# Patient Record
Sex: MALE | Race: WHITE | NOT HISPANIC OR LATINO | Employment: FULL TIME | URBAN - METROPOLITAN AREA
[De-identification: names, ages, dates, MRNs, and addresses within clinical notes are randomized per-mention and may not be internally consistent; named-entity substitution may affect disease eponyms.]

---

## 2018-09-14 ENCOUNTER — TRANSFERRED RECORDS (OUTPATIENT)
Dept: HEALTH INFORMATION MANAGEMENT | Facility: CLINIC | Age: 53
End: 2018-09-14

## 2018-09-14 ENCOUNTER — HOSPITAL ENCOUNTER (EMERGENCY)
Facility: CLINIC | Age: 53
Discharge: HOME | End: 2018-09-14
Attending: PHYSICIAN ASSISTANT | Admitting: PHYSICIAN ASSISTANT
Payer: COMMERCIAL

## 2018-09-14 ENCOUNTER — APPOINTMENT (OUTPATIENT)
Dept: CT IMAGING | Facility: CLINIC | Age: 53
End: 2018-09-14
Attending: PHYSICIAN ASSISTANT
Payer: COMMERCIAL

## 2018-09-14 ENCOUNTER — APPOINTMENT (OUTPATIENT)
Dept: GENERAL RADIOLOGY | Facility: CLINIC | Age: 53
End: 2018-09-14
Attending: PHYSICIAN ASSISTANT
Payer: COMMERCIAL

## 2018-09-14 VITALS
RESPIRATION RATE: 18 BRPM | SYSTOLIC BLOOD PRESSURE: 127 MMHG | DIASTOLIC BLOOD PRESSURE: 75 MMHG | HEART RATE: 67 BPM | OXYGEN SATURATION: 97 % | TEMPERATURE: 98.4 F

## 2018-09-14 DIAGNOSIS — R50.9 FEVER, UNSPECIFIED FEVER CAUSE: ICD-10-CM

## 2018-09-14 DIAGNOSIS — R51.9 NONINTRACTABLE HEADACHE, UNSPECIFIED CHRONICITY PATTERN, UNSPECIFIED HEADACHE TYPE: ICD-10-CM

## 2018-09-14 LAB
ALBUMIN SERPL-MCNC: 3.7 G/DL (ref 3.4–5)
ALBUMIN UR-MCNC: 100 MG/DL
ALP SERPL-CCNC: 48 U/L (ref 40–150)
ALT SERPL W P-5'-P-CCNC: 133 U/L (ref 0–70)
ANION GAP SERPL CALCULATED.3IONS-SCNC: 8 MMOL/L (ref 3–14)
APPEARANCE UR: CLEAR
AST SERPL W P-5'-P-CCNC: 125 U/L (ref 0–45)
BACTERIA #/AREA URNS HPF: ABNORMAL /HPF
BASOPHILS # BLD AUTO: 0.1 10E9/L (ref 0–0.2)
BASOPHILS NFR BLD AUTO: 1 %
BILIRUB SERPL-MCNC: 0.7 MG/DL (ref 0.2–1.3)
BILIRUB UR QL STRIP: NEGATIVE
BUN SERPL-MCNC: 17 MG/DL (ref 7–30)
CALCIUM SERPL-MCNC: 8.3 MG/DL (ref 8.5–10.1)
CHLORIDE SERPL-SCNC: 91 MMOL/L (ref 94–109)
CO2 BLDCOV-SCNC: 28 MMOL/L (ref 21–28)
CO2 SERPL-SCNC: 29 MMOL/L (ref 20–32)
COLOR UR AUTO: YELLOW
CREAT SERPL-MCNC: 1.32 MG/DL (ref 0.66–1.25)
DIFFERENTIAL METHOD BLD: NORMAL
EOSINOPHIL # BLD AUTO: 0.1 10E9/L (ref 0–0.7)
EOSINOPHIL NFR BLD AUTO: 1 %
ERYTHROCYTE [DISTWIDTH] IN BLOOD BY AUTOMATED COUNT: 12.7 % (ref 10–15)
GFR SERPL CREATININE-BSD FRML MDRD: 57 ML/MIN/1.7M2
GLUCOSE SERPL-MCNC: 116 MG/DL (ref 70–99)
GLUCOSE UR STRIP-MCNC: NEGATIVE MG/DL
HCT VFR BLD AUTO: 40.4 % (ref 40–53)
HGB BLD-MCNC: 14.1 G/DL (ref 13.3–17.7)
HGB UR QL STRIP: ABNORMAL
KETONES UR STRIP-MCNC: 5 MG/DL
LACTATE BLD-SCNC: 0.7 MMOL/L (ref 0.7–2.1)
LEUKOCYTE ESTERASE UR QL STRIP: NEGATIVE
LYMPHOCYTES # BLD AUTO: 3.5 10E9/L (ref 0.8–5.3)
LYMPHOCYTES NFR BLD AUTO: 40 %
MCH RBC QN AUTO: 30.5 PG (ref 26.5–33)
MCHC RBC AUTO-ENTMCNC: 34.9 G/DL (ref 31.5–36.5)
MCV RBC AUTO: 87 FL (ref 78–100)
MONOCYTES # BLD AUTO: 0.7 10E9/L (ref 0–1.3)
MONOCYTES NFR BLD AUTO: 8 %
NEUTROPHILS # BLD AUTO: 4.4 10E9/L (ref 1.6–8.3)
NEUTROPHILS NFR BLD AUTO: 50 %
NITRATE UR QL: NEGATIVE
PCO2 BLDV: 35 MM HG (ref 40–50)
PH BLDV: 7.52 PH (ref 7.32–7.43)
PH UR STRIP: 6 PH (ref 5–7)
PLATELET # BLD AUTO: 151 10E9/L (ref 150–450)
PLATELET # BLD EST: NORMAL 10*3/UL
PO2 BLDV: 53 MM HG (ref 25–47)
POTASSIUM SERPL-SCNC: 3.4 MMOL/L (ref 3.4–5.3)
PROT SERPL-MCNC: 7.5 G/DL (ref 6.8–8.8)
RBC # BLD AUTO: 4.62 10E12/L (ref 4.4–5.9)
RBC #/AREA URNS AUTO: <1 /HPF (ref 0–2)
RBC MORPH BLD: NORMAL
SAO2 % BLDV FROM PO2: 90 %
SODIUM SERPL-SCNC: 128 MMOL/L (ref 133–144)
SOURCE: ABNORMAL
SP GR UR STRIP: 1.02 (ref 1–1.03)
UROBILINOGEN UR STRIP-MCNC: 4 MG/DL (ref 0–2)
WBC # BLD AUTO: 8.7 10E9/L (ref 4–11)
WBC #/AREA URNS AUTO: <1 /HPF (ref 0–5)

## 2018-09-14 PROCEDURE — 36415 COLL VENOUS BLD VENIPUNCTURE: CPT | Performed by: PHYSICIAN ASSISTANT

## 2018-09-14 PROCEDURE — 85025 COMPLETE CBC W/AUTO DIFF WBC: CPT | Performed by: EMERGENCY MEDICINE

## 2018-09-14 PROCEDURE — 99285 EMERGENCY DEPT VISIT HI MDM: CPT | Mod: 25

## 2018-09-14 PROCEDURE — 87040 BLOOD CULTURE FOR BACTERIA: CPT | Performed by: PHYSICIAN ASSISTANT

## 2018-09-14 PROCEDURE — 81001 URINALYSIS AUTO W/SCOPE: CPT | Performed by: PHYSICIAN ASSISTANT

## 2018-09-14 PROCEDURE — 62270 DX LMBR SPI PNXR: CPT

## 2018-09-14 PROCEDURE — 25000128 H RX IP 250 OP 636: Performed by: PHYSICIAN ASSISTANT

## 2018-09-14 PROCEDURE — 70450 CT HEAD/BRAIN W/O DYE: CPT

## 2018-09-14 PROCEDURE — 96361 HYDRATE IV INFUSION ADD-ON: CPT

## 2018-09-14 PROCEDURE — 82803 BLOOD GASES ANY COMBINATION: CPT

## 2018-09-14 PROCEDURE — 80053 COMPREHEN METABOLIC PANEL: CPT | Performed by: EMERGENCY MEDICINE

## 2018-09-14 PROCEDURE — 96374 THER/PROPH/DIAG INJ IV PUSH: CPT

## 2018-09-14 PROCEDURE — 71046 X-RAY EXAM CHEST 2 VIEWS: CPT

## 2018-09-14 PROCEDURE — 83605 ASSAY OF LACTIC ACID: CPT

## 2018-09-14 RX ORDER — LIDOCAINE HYDROCHLORIDE 10 MG/ML
INJECTION, SOLUTION INFILTRATION; PERINEURAL
Status: DISCONTINUED
Start: 2018-09-14 | End: 2018-09-15 | Stop reason: HOSPADM

## 2018-09-14 RX ORDER — KETOROLAC TROMETHAMINE 30 MG/ML
30 INJECTION, SOLUTION INTRAMUSCULAR; INTRAVENOUS ONCE
Status: COMPLETED | OUTPATIENT
Start: 2018-09-14 | End: 2018-09-14

## 2018-09-14 RX ORDER — LIDOCAINE 40 MG/G
CREAM TOPICAL
Status: DISCONTINUED | OUTPATIENT
Start: 2018-09-14 | End: 2018-09-15 | Stop reason: HOSPADM

## 2018-09-14 RX ORDER — HYDROMORPHONE HYDROCHLORIDE 1 MG/ML
0.5 INJECTION, SOLUTION INTRAMUSCULAR; INTRAVENOUS; SUBCUTANEOUS
Status: DISCONTINUED | OUTPATIENT
Start: 2018-09-14 | End: 2018-09-15 | Stop reason: HOSPADM

## 2018-09-14 RX ADMIN — SODIUM CHLORIDE 1000 ML: 9 INJECTION, SOLUTION INTRAVENOUS at 20:24

## 2018-09-14 RX ADMIN — KETOROLAC TROMETHAMINE 30 MG: 30 INJECTION, SOLUTION INTRAMUSCULAR at 20:23

## 2018-09-14 NOTE — ED AVS SNAPSHOT
[facility] Emergency Department    [address]    [facility] [address]    Phone:  [phone]    Fax:  [phone]                                       Isacc Cool   MRN: [id number]    Department:  [facility] Emergency Department   Date of Visit:  9/14/2018           After Visit Summary Signature Page     I have received my discharge instructions, and my questions have been answered. I have discussed any challenges I see with this plan with the nurse or doctor.    ..........................................................................................................................................  Patient/Patient Representative Signature      ..........................................................................................................................................  Patient Representative Print Name and Relationship to Patient    ..................................................               ................................................  Date                                   Time    ..........................................................................................................................................  Reviewed by Signature/Title    ...................................................              ..............................................  Date                                               Time          22EPIC Rev 08/18

## 2018-09-14 NOTE — ED AVS SNAPSHOT
[facility] Emergency Department    [address]    [facility] [address]    Phone:  [phone]    Fax:  [phone]                                       Isacc Cool   MRN: [id number]    Department:  [facility] Emergency Department   Date of Visit:  9/14/2018           Patient Information     Date Of Birth          1965        Your diagnoses for this visit were:     Nonintractable headache, unspecified chronicity pattern, unspecified headache type     Fever, unspecified fever cause        You were seen by Lenora Fofana PA-C.      Follow-up Information     Follow up with [facility] Emergency Department.    Specialty:  EMERGENCY MEDICINE    Why:  If symptoms worsen    Contact information:    [address]  [facility] [address] [phone]        Discharge Instructions       Discharge Instructions  Headache    You were seen today for a headache. Headaches may be caused by many different things such as muscle tension, sinus inflammation, anxiety and stress, having too little sleep, too much alcohol, some medical conditions or injury. You may have a migraine, which is caused by changes in the blood vessels in your head.  At this time your provider does not find that your headache is a sign of anything dangerous or life-threatening.  However, sometimes the signs of serious illness do not show up right away.      Generally, every Emergency Department visit should have a follow-up clinic visit with either a primary or a specialty clinic/provider. Please follow-up as instructed by your emergency provider today.    Return to the Emergency Department if:    You get a new fever of 100.4 F or higher.    Your headache gets much worse.    You get a stiff neck with your headache.    You get a new headache that is significantly different or worse than headaches you have had before.    You are vomiting (throwing up) and cannot keep food or water  down.    You have blurry or double vision or other problems with your eyes.    You have a new weakness on one side of your body.    You have difficulty with balance which is new.    You or your family thinks you are confused.    You have a seizure.    What can I do to help myself?    Pain medications - You may take a pain medication such as Tylenol  (acetaminophen), Advil , Motrin  (ibuprofen) or Aleve  (naproxen).    Take a pain reliever as soon as you notice symptoms.  Starting medications as soon as you start to have symptoms may lessen the amount of pain you have.    Relaxing in a quiet, dark room may help.    Get enough sleep and eat meals regularly.    You may need to watch for certain foods or other things which may trigger your headaches.  Keeping a journal of your headaches and possible triggers may help you and your primary provider to identify things which you should avoid which may be causing your headaches.  If you were given a prescription for medicine here today, be sure to read all of the information (including the package insert) that comes with your prescription.  This will include important information about the medicine, its side effects, and any warnings that you need to know about.  The pharmacist who fills the prescription can provide more information and answer questions you may have about the medicine.  If you have questions or concerns that the pharmacist cannot address, please call or return to the Emergency Department.   Remember that you can always come back to the Emergency Department if you are not able to see your regular provider in the amount of time listed above, if you get any new symptoms, or if there is anything that worries you.    24 Hour Appointment Hotline       To make an appointment at any [facility], call [phone] ([phone]). If you don't have a family doctor or clinic, we will help you find one. [facility] are conveniently located to serve the  needs of you and your family.             Review of your medicines      Our records show that you are taking the medicines listed below. If these are incorrect, please call your family doctor or clinic.        Dose / Directions Last dose taken    cetirizine 10 MG tablet   Commonly known as:  zyrTEC   Dose:  10 mg        Take 10 mg by mouth daily as needed for allergies   Refills:  0        fluticasone 50 MCG/ACT spray   Commonly known as:  FLONASE   Dose:  2 spray        Spray 2 sprays into both nostrils daily as needed for rhinitis or allergies   Refills:  0        lisinopril-hydrochlorothiazide 20-25 MG per tablet   Commonly known as:  PRINZIDE/ZESTORETIC   Dose:  1 tablet        Take 1 tablet by mouth daily   Refills:  0        OMEPRAZOLE PO   Dose:  20 mg        Take 20 mg by mouth every morning   Refills:  0        * oxyCODONE IR 5 MG tablet   Commonly known as:  ROXICODONE   Dose:  5-10 mg   Quantity:  60 tablet        Take 1-2 tablets (5-10 mg) by mouth every 4 hours as needed for pain or other (Moderate to Severe)   Refills:  0        * oxyCODONE IR 5 MG tablet   Commonly known as:  ROXICODONE   Dose:  5-10 mg   Quantity:  30 tablet        Take 1-2 tablets (5-10 mg) by mouth every 3 hours as needed for moderate to severe pain   Refills:  0        polyethylene glycol Packet   Commonly known as:  MIRALAX/GLYCOLAX   Dose:  17 g   Quantity:  7 packet        Take 17 g by mouth daily as needed for constipation   Refills:  0        * Notice:  This list has 2 medication(s) that are the same as other medications prescribed for you. Read the directions carefully, and ask your doctor or other care provider to review them with you.            Procedures and tests performed during your visit     Procedure/Test Number of Times Performed    Blood culture 2    CBC with platelets differential 1    Comprehensive metabolic panel 1    Draw and hold 3    Head CT w/o contrast 1    ISTAT CG4 gases lactate bhaart nursing POCT 1     ISTAT gases lactate bharat POCT 1    Peripheral IV: Standard 1    Pulse oximetry nursing 1    UA with Microscopic 1    XR Chest 2 Views 1      Orders Needing Specimen Collection     Ordered          09/14/18 2133  Glucose CSF: Tube 2 - STAT, Prio: STAT, Status: Sent     Scheduled Task Status   09/14/18 2133 [employer] CC Reminder: Open   Order Class:  PCU Collect                09/14/18 2133  Protein total CSF: Tube 2 - STAT, Prio: STAT, Status: Sent     Scheduled Task Status   09/14/18 2133 [employer] CC Reminder: Open   Order Class:  PCU Collect                09/14/18 2133  Gram stain - STAT, Prio: STAT, Status: Sent     Scheduled Task Status   09/14/18 2133 [employer] CC Reminder: Open   Order Class:  PCU Collect                09/14/18 2133  CSF Culture Aerobic Bacterial - STAT, Prio: STAT, Status: Sent     Scheduled Task Status   09/14/18 2133 [employer] CC Reminder: Open   Order Class:  PCU Collect                09/14/18 2133  Cell count with differential CSF: Tube 4 - STAT, Prio: STAT, Status: Sent     Scheduled Task Status   09/14/18 2133 [employer] CC Reminder: Open   Order Class:  PCU Collect                  Pending Results     Date and Time Order Name Status Description    9/14/2018 2009 Blood culture Preliminary     9/14/2018 2008 Blood culture Preliminary             Pending Culture Results     Date and Time Order Name Status Description    9/14/2018 2009 Blood culture Preliminary     9/14/2018 2008 Blood culture Preliminary             Pending Results Instructions     If you had any lab results that were not finalized at the time of your Discharge, you can call the ED Lab Result RN at [phone]. You will be contacted by this team for any positive Lab results or changes in treatment. The nurses are available 7 days a week from 10A to 6:30P.  You can leave a message 24 hours per day and they will return your call.        Test Results From Your Hospital Stay        9/14/2018  8:27 PM      Component Results      Component Value Ref Range & Units Status    Sodium 128 (L) 133 - 144 mmol/L Final    Potassium 3.4 3.4 - 5.3 mmol/L Final    Chloride 91 (L) 94 - 109 mmol/L Final    Carbon Dioxide 29 20 - 32 mmol/L Final    Anion Gap 8 3 - 14 mmol/L Final    Glucose 116 (H) 70 - 99 mg/dL Final    Urea Nitrogen 17 7 - 30 mg/dL Final    Creatinine 1.32 (H) 0.66 - 1.25 mg/dL Final    GFR Estimate 57 (L) >60 mL/min/1.7m2 Final    Non  GFR Calc    GFR Estimate If Black 69 >60 mL/min/1.7m2 Final    African American GFR Calc    Calcium 8.3 (L) 8.5 - 10.1 mg/dL Final    Bilirubin Total 0.7 0.2 - 1.3 mg/dL Final    Albumin 3.7 3.4 - 5.0 g/dL Final    Protein Total 7.5 6.8 - 8.8 g/dL Final    Alkaline Phosphatase 48 40 - 150 U/L Final     (H) 0 - 70 U/L Final     (H) 0 - 45 U/L Final         9/14/2018  9:03 PM      Component Results     Component Value Ref Range & Units Status    WBC 8.7 4.0 - 11.0 10e9/L Final    RBC Count 4.62 4.4 - 5.9 10e12/L Final    Hemoglobin 14.1 13.3 - 17.7 g/dL Final    Hematocrit 40.4 40.0 - 53.0 % Final    MCV 87 78 - 100 fl Final    MCH 30.5 26.5 - 33.0 pg Final    MCHC 34.9 31.5 - 36.5 g/dL Final    RDW 12.7 10.0 - 15.0 % Final    Platelet Count 151 150 - 450 10e9/L Final    Diff Method Manual Differential  Final    % Neutrophils 50.0 % Final    % Lymphocytes 40.0 % Final    % Monocytes 8.0 % Final    % Eosinophils 1.0 % Final    % Basophils 1.0 % Final    Absolute Neutrophil 4.4 1.6 - 8.3 10e9/L Final    Absolute Lymphocytes 3.5 0.8 - 5.3 10e9/L Final    Absolute Monocytes 0.7 0.0 - 1.3 10e9/L Final    Absolute Eosinophils 0.1 0.0 - 0.7 10e9/L Final    Absolute Basophils 0.1 0.0 - 0.2 10e9/L Final    RBC Morphology   Final    Consistent with reported results    Platelet Estimate   Final    Automated count confirmed.  Platelet morphology is normal.               9/14/2018  7:57 PM      Component Results     Component Value Ref Range & Units Status    Ph Venous 7.52 (H) 7.32 - 7.43  pH Final    PCO2 Venous 35 (L) 40 - 50 mm Hg Final    PO2 Venous 53 (H) 25 - 47 mm Hg Final    Bicarbonate Venous 28 21 - 28 mmol/L Final    O2 Sat Venous 90 % Final    Lactic Acid 0.7 0.7 - 2.1 mmol/L Final         9/14/2018  8:30 PM      Component Results     Component Value Ref Range & Units Status    Color Urine Yellow  Final    Appearance Urine Clear  Final    Glucose Urine Negative NEG^Negative mg/dL Final    Bilirubin Urine Negative NEG^Negative Final    Ketones Urine 5 (A) NEG^Negative mg/dL Final    Specific Gravity Urine 1.017 1.003 - 1.035 Final    Blood Urine Moderate (A) NEG^Negative Final    pH Urine 6.0 5.0 - 7.0 pH Final    Protein Albumin Urine 100 (A) NEG^Negative mg/dL Final    Urobilinogen mg/dL 4.0 (H) 0.0 - 2.0 mg/dL Final    Nitrite Urine Negative NEG^Negative Final    Leukocyte Esterase Urine Negative NEG^Negative Final    Source Midstream Urine  Final    WBC Urine <1 0 - 5 /HPF Final    RBC Urine <1 0 - 2 /HPF Final    Bacteria Urine Few (A) NEG^Negative /HPF Final         9/14/2018  9:46 PM      Component Results     Component    Specimen Description    Blood Right Arm    Special Requests    Aerobic and anaerobic bottles received    Culture Micro    No growth after 1 hour         9/14/2018  9:19 PM      Narrative     CHEST TWO VIEWS 9/14/2018 9:09 PM     HISTORY: Fever, cough.    COMPARISON: 10/27/2015    FINDINGS: Heart size and pulmonary vascularity are within normal  limits. The lungs are clear. No pneumothorax or pleural effusion.  Cervical fusion hardware noted        Impression     IMPRESSION: No radiographic evidence of acute chest abnormality.     NICOLE MELCHOR MD         9/14/2018 10:37 PM      Component Results     Component    Specimen Description    Blood Left Arm    Special Requests    Aerobic and anaerobic bottles received    Culture Micro    No growth after 1 hour         9/14/2018  9:30 PM      Narrative     CT SCAN OF THE HEAD WITHOUT CONTRAST   9/14/2018 9:18 PM      HISTORY: Headache, fever.    TECHNIQUE: Axial images of the head and coronal reformations without  IV contrast material. Radiation dose for this scan was reduced using  automated exposure control, adjustment of the mA and/or kV according  to patient size, or iterative reconstruction technique.    COMPARISON: 10/23/2015    FINDINGS: The ventricles are normal in size, shape and configuration.  The brain parenchyma and subarachnoid spaces are normal. There is no  evidence of intracranial hemorrhage, mass, acute infarct or anomaly.     The visualized portions of the sinuses and mastoids appear normal.  There is no evidence of trauma.        Impression     IMPRESSION: Normal CT scan of the head. No change.      KATTY GARNETT MD                Clinical Quality Measure: Blood Pressure Screening     Your blood pressure was checked while you were in the emergency department today. The last reading we obtained was  BP: (!) 130/99 . Please read the guidelines below about what these numbers mean and what you should do about them.  If your systolic blood pressure (the top number) is less than 120 and your diastolic blood pressure (the bottom number) is less than 80, then your blood pressure is normal. There is nothing more that you need to do about it.  If your systolic blood pressure (the top number) is 120-139 or your diastolic blood pressure (the bottom number) is 80-89, your blood pressure may be higher than it should be. You should have your blood pressure rechecked within a year by a primary care provider.  If your systolic blood pressure (the top number) is 140 or greater or your diastolic blood pressure (the bottom number) is 90 or greater, you may have high blood pressure. High blood pressure is treatable, but if left untreated over time it can put you at risk for heart attack, stroke, or kidney failure. You should have your blood pressure rechecked by a primary care provider within the next 4 weeks.  If your  "provider in the emergency department today gave you specific instructions to follow-up with your doctor or provider even sooner than that, you should follow that instruction and not wait for up to 4 weeks for your follow-up visit.        Thank you for choosing [facility]       Thank you for choosing [facility] for your care. Our goal is always to provide you with excellent care. Hearing back from our patients is one way we can continue to improve our services. Please take a few minutes to complete the written survey that you may receive in the mail after you visit with us. Thank you!        [employer] Information     [employer] lets you send messages to your doctor, view your test results, renew your prescriptions, schedule appointments and more. To sign up, go to [email or web address] . Click on \"Log in\" on the left side of the screen, which will take you to the Welcome page. Then click on \"Sign up Now\" on the right side of the page.     You will be asked to enter the access code listed below, as well as some personal information. Please follow the directions to create your username and password.     Your access code is: [email or web address]  Expires: 2018 10:54 PM     Your access code will  in 90 days. If you need help or a new code, please call your [facility] clinic or [phone].        Care EveryWhere ID     This is your Care EveryWhere ID. This could be used by other organizations to access your [facility] medical records  [id number]        Equal Access to Services     CLAUDY GUTIERREZ AH: Hadii deepa aguilerao Sobeckie, waaxda luqadaha, qaybta kaalmada vaughn, bertha sanchez . So [facility] [phone].    ATENCIÓN: Si habla español, tiene a conde disposición servicios gratuitos de asistencia lingüística. Llame al [phone].    We comply with applicable federal civil rights laws and [place] laws. We do not discriminate on the basis of race, color, national origin, age, disability, sex, sexual " orientation, or gender identity.            After Visit Summary       This is your record. Keep this with you and show to your community pharmacist(s) and doctor(s) at your next visit.

## 2018-09-15 ASSESSMENT — ENCOUNTER SYMPTOMS
HEADACHES: 1
FEVER: 1
COUGH: 1

## 2018-09-15 NOTE — ED TRIAGE NOTES
ABC's intact.  Alert and oriented x4.    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  Pt presents to ED with fever since Monday, increased headache and neck pain

## 2018-09-15 NOTE — ED PROVIDER NOTES
History     Chief Complaint:  Headache and Fever      HPI   Isacc Cool is a 53 year old male who presents with headache and fever.  Patient reports 4 days of intermittent fevers and persistent headache.  Headache is global and throbbing. it is severe.   Headache is worse with coughing.  He reports some body aches and a dry cough as well.  He has taken some over-the-counter cold medications without relief.  He reports a history of bacterial meningitis that was a complication of a spinal surgery a few years ago.  He denies neck pain or stiffness.  He denies chest pain or shortness of breath.  No abdominal pain, nausea, vomiting, diarrhea, urinary symptoms. Denies sore throat.    Allergies:  No Known Allergies     Medications:      cetirizine (ZYRTEC) 10 MG tablet   fluticasone (FLONASE) 50 MCG/ACT nasal spray   lisinopril-hydrochlorothiazide (PRINZIDE,ZESTORETIC) 20-25 MG per tablet   OMEPRAZOLE PO   oxyCODONE (ROXICODONE) 5 MG immediate release tablet   oxyCODONE (ROXICODONE) 5 MG immediate release tablet   polyethylene glycol (MIRALAX/GLYCOLAX) packet         Past Medical History:    Past Medical History:   Diagnosis Date     Gastro-oesophageal reflux disease      Hypertension      Numbness and tingling      Other chronic pain        Past Surgical History:    Past Surgical History:   Procedure Laterality Date     ARTHROSCOPY KNEE Right     knee     BACK SURGERY      BHA implants     DECOMPRESSION, FUSION CERVICAL ANTERIOR THREE+ LEVELS, COMBINED N/A 6/24/2015    Procedure: COMBINED DECOMPRESSION, FUSION CERVICAL ANTERIOR THREE+ LEVELS;  Surgeon: Reinier Penaloza MD;  Location: [facility] OR     FORAMINOTOMY CERVICAL POSTERIOR ONE LEVEL Right 10/21/2015    Procedure: FORAMINOTOMY CERVICAL POSTERIOR ONE LEVEL;  Surgeon: Reinier Penaloza MD;  Location: [facility] OR       Family History:    History reviewed.  No pertinent history.    Social History:  Marital Status:   [2]  Social History    Substance Use Topics     Smoking status: Never Smoker     Smokeless tobacco: Former User     Types: Chew     Quit date: 1/1/2008     Alcohol use Yes      Comment: 14 cans of beer per week        Review of Systems   Constitutional: Positive for fever.   Respiratory: Positive for cough.    Neurological: Positive for headaches.   All other systems reviewed and are negative.        Physical Exam   First Vitals:  BP: (!) 158/95  Pulse: 102  Temp: 102.7  F (39.3  C)  Resp: 20  SpO2: 98 %      Physical Exam   Constitutional: He is oriented to person, place, and time. No distress.   HENT:   Mouth/Throat: Oropharynx is clear and moist.   Eyes: Conjunctivae are normal.   Neck: Normal range of motion.   Cardiovascular: Normal rate and regular rhythm.    Pulmonary/Chest: Effort normal and breath sounds normal. No respiratory distress.   Abdominal: Soft. He exhibits no distension. There is no tenderness. There is no rebound and no guarding.   Musculoskeletal: Normal range of motion. He exhibits no edema or deformity.   Neurological: He is alert and oriented to person, place, and time. He has normal strength. No sensory deficit. Coordination and gait normal.   Skin: Skin is warm. No petechiae and no rash noted. He is diaphoretic.         Emergency Department Course     Imaging:  Radiographic findings were communicated with the patient who voiced understanding of the findings.  Head CT w/o contrast   Final Result   IMPRESSION: Normal CT scan of the head. No change.         KATTY GARNETT MD      XR Chest 2 Views   Final Result   IMPRESSION: No radiographic evidence of acute chest abnormality.       NICOLE MELCHOR MD          Laboratory:  Labs Ordered and Resulted from Time of ED Arrival Up to the Time of Departure from the ED   COMPREHENSIVE METABOLIC PANEL - Abnormal; Notable for the following:        Result Value    Sodium 128 (*)     Chloride 91 (*)     Glucose 116 (*)     Creatinine 1.32 (*)     GFR Estimate 57 (*)      Calcium 8.3 (*)      (*)      (*)     All other components within normal limits   ROUTINE UA WITH MICROSCOPIC - Abnormal; Notable for the following:     Ketones Urine 5 (*)     Blood Urine Moderate (*)     Protein Albumin Urine 100 (*)     Urobilinogen mg/dL 4.0 (*)     Bacteria Urine Few (*)     All other components within normal limits   ISTAT  GASES LACTATE VICTOR HUGO POCT - Abnormal; Notable for the following:     Ph Venous 7.52 (*)     PCO2 Venous 35 (*)     PO2 Venous 53 (*)     All other components within normal limits   CBC WITH PLATELETS DIFFERENTIAL   PERIPHERAL IV CATHETER   PULSE OXIMETRY NURSING   NURSING DRAW AND HOLD   NURSING DRAW AND HOLD   NURSING DRAW AND HOLD   ISTAT CG4 GASES LACTATE VICTOR HUGO NURSING POCT     Procedures:       Lumbar Puncture:      Time: 2:45 AM  Performed by: Lenora Fofana  Authorized by: Lenora Fofana    Indications: headache and fever    Consent given by: Patient who states understanding of the procedure being performed after discussing the risks, benefits and alternatives.    Prior to the start of the procedure and with procedural staff participation, I verbally confirmed the patient s identity using two indicators, relevant allergies, that the procedure was appropriate and matched the consent or emergent situation, and that the correct equipment/implants were available. Immediately prior to starting the procedure I conducted the Time Out with the procedural staff and re-confirmed the patient s name, procedure, and site/side. (The Joint Commission universal protocol was followed.) Yes    Under sterile conditions the patient was positioned L Lateral decubitus with knees drawn up. Betadine solution and sterile drapes were utilized.  Local anesthetic at the site: 2 ml of lidocaine 1% without epinephrine from the LP tray  A 21 G  spinal needle was inserted at the L 2-3 interspace. 2 attempts were made, but no cerebrospinal fluid was able to be collected so the procedure was  aborted.    Complications:  None    Patient tolerance: Patient tolerated the procedure well with no immediate complications.        Interventions:  Medications   0.9% sodium chloride BOLUS (0 mLs Intravenous Stopped 18)   ketorolac (TORADOL) injection 30 mg (30 mg Intravenous Given 18)         Emergency Department Course:  Past medical records, nursing notes, and vitals reviewed.  I performed an exam of the patient and obtained history, as documented above.  Labs obtained.   The patient was sent for a CT and CXR while in the ED with results above.  LP attempted.  Discussed with IR and anesthesia - IR would need to perform.   Patient then refusing further LP attempts and requesting discharge.   Discharged AMA.      Impression & Plan      Medical Decision Makin-year-old male presenting with headache and fever ×4 days.  Differential diagnosis includes meningitis, influenza, UTI, strep pharyngitis, pneumonia, among others.  Lactate normal so sepsis not likely. There is no evidence of UTI on urinalysis.  No evidence of pneumonia on chest x-ray. No leukocytosis. Chemistry was notable for mild hyponatremia of 128 and mild EDDIE with creatinine of 1.32. patient was given normal saline.  Given his fever and headache without another obvious source of fever, meningitis is of course a concern.  I did attempt an LP, but was unsuccessful.  I discussed with IR and anesthesiology for further assistance with LP.  Anesthesiology did not feel comfortable given his previous lumbar spine surgery.  IR was going to come in to perform LP, but patient then was further declining declining further LP attempts stating he was feeling improved.  I voiced my concern to him that meningitis is still a possibility and that the only way to know would be doing the LP.  I discussed that if this was meningitis, it could progress quickly and he could in fact die from it.  He expressed understanding of this, but continued to  decline further workup or intervention at this time and continued to express wanting to be discharged.  I informed him of his abnormal lab results as well.  He continues to refuse admission for empiric antibiotics or further LP attempts.  He was discharged home AGAINST MEDICAL ADVICE.  He was instructed to return to the emergency department immediately for any new or worsening symptoms Or if he changes his mind about further workup including LP.  He should otherwise follow-up with his PCP if he feels he is continuing to improve.        Diagnosis:    ICD-10-CM    1. Nonintractable headache, unspecified chronicity pattern, unspecified headache type R51 Blood culture     Blood culture   2. Fever, unspecified fever cause R50.9        Disposition:  discharged to home against medical advice.    Discharge Medications:  Discharge Medication List as of 9/14/2018 10:54 PM            Lenora Fofana  9/14/2018   [facility] EMERGENCY DEPARTMENT       Lenora Fofana PA-C  09/15/18 0255

## 2018-09-20 LAB
BACTERIA SPEC CULT: NO GROWTH
BACTERIA SPEC CULT: NO GROWTH
Lab: NORMAL
Lab: NORMAL
SPECIMEN SOURCE: NORMAL
SPECIMEN SOURCE: NORMAL

## 2019-06-07 ENCOUNTER — APPOINTMENT (OUTPATIENT)
Dept: CT IMAGING | Facility: CLINIC | Age: 54
End: 2019-06-07
Attending: EMERGENCY MEDICINE
Payer: COMMERCIAL

## 2019-06-07 ENCOUNTER — HOSPITAL ENCOUNTER (OUTPATIENT)
Facility: CLINIC | Age: 54
Setting detail: OBSERVATION
Discharge: HOME | End: 2019-06-08
Attending: EMERGENCY MEDICINE | Admitting: INTERNAL MEDICINE
Payer: COMMERCIAL

## 2019-06-07 DIAGNOSIS — I20.0 UNSTABLE ANGINA (H): ICD-10-CM

## 2019-06-07 DIAGNOSIS — R07.9 ACUTE CHEST PAIN: Primary | ICD-10-CM

## 2019-06-07 DIAGNOSIS — R91.8 PULMONARY NODULES: ICD-10-CM

## 2019-06-07 PROBLEM — I24.9 ACS (ACUTE CORONARY SYNDROME) (H): Status: ACTIVE | Noted: 2019-06-07

## 2019-06-07 LAB
ANION GAP SERPL CALCULATED.3IONS-SCNC: 8 MMOL/L (ref 3–14)
APTT PPP: 32 SEC (ref 22–37)
BASOPHILS # BLD AUTO: 0.1 10E9/L (ref 0–0.2)
BASOPHILS NFR BLD AUTO: 0.7 %
BUN SERPL-MCNC: 16 MG/DL (ref 7–30)
CALCIUM SERPL-MCNC: 9 MG/DL (ref 8.5–10.1)
CHLORIDE SERPL-SCNC: 96 MMOL/L (ref 94–109)
CO2 SERPL-SCNC: 26 MMOL/L (ref 20–32)
CREAT SERPL-MCNC: 1.12 MG/DL (ref 0.66–1.25)
DIFFERENTIAL METHOD BLD: NORMAL
EOSINOPHIL # BLD AUTO: 0.1 10E9/L (ref 0–0.7)
EOSINOPHIL NFR BLD AUTO: 1 %
ERYTHROCYTE [DISTWIDTH] IN BLOOD BY AUTOMATED COUNT: 12.4 % (ref 10–15)
GFR SERPL CREATININE-BSD FRML MDRD: 75 ML/MIN/{1.73_M2}
GLUCOSE SERPL-MCNC: 110 MG/DL (ref 70–99)
HCT VFR BLD AUTO: 43.3 % (ref 40–53)
HGB BLD-MCNC: 14.9 G/DL (ref 13.3–17.7)
IMM GRANULOCYTES # BLD: 0 10E9/L (ref 0–0.4)
IMM GRANULOCYTES NFR BLD: 0.4 %
INR PPP: 0.99 (ref 0.86–1.14)
INTERPRETATION ECG - MUSE: NORMAL
LYMPHOCYTES # BLD AUTO: 3.8 10E9/L (ref 0.8–5.3)
LYMPHOCYTES NFR BLD AUTO: 46.2 %
MCH RBC QN AUTO: 30.8 PG (ref 26.5–33)
MCHC RBC AUTO-ENTMCNC: 34.4 G/DL (ref 31.5–36.5)
MCV RBC AUTO: 90 FL (ref 78–100)
MONOCYTES # BLD AUTO: 1.2 10E9/L (ref 0–1.3)
MONOCYTES NFR BLD AUTO: 14.3 %
NEUTROPHILS # BLD AUTO: 3.1 10E9/L (ref 1.6–8.3)
NEUTROPHILS NFR BLD AUTO: 37.4 %
NRBC # BLD AUTO: 0 10*3/UL
NRBC BLD AUTO-RTO: 0 /100
PLATELET # BLD AUTO: 161 10E9/L (ref 150–450)
POTASSIUM SERPL-SCNC: 3.1 MMOL/L (ref 3.4–5.3)
POTASSIUM SERPL-SCNC: 3.3 MMOL/L (ref 3.4–5.3)
RBC # BLD AUTO: 4.83 10E12/L (ref 4.4–5.9)
SODIUM SERPL-SCNC: 130 MMOL/L (ref 133–144)
TROPONIN I BLD-MCNC: 0.01 UG/L (ref 0–0.08)
TROPONIN I SERPL-MCNC: <0.015 UG/L (ref 0–0.04)
TROPONIN I SERPL-MCNC: <0.015 UG/L (ref 0–0.04)
WBC # BLD AUTO: 8.1 10E9/L (ref 4–11)

## 2019-06-07 PROCEDURE — 12000000 ZZH R&B MED SURG/OB

## 2019-06-07 PROCEDURE — 25000128 H RX IP 250 OP 636: Performed by: INTERNAL MEDICINE

## 2019-06-07 PROCEDURE — 99223 1ST HOSP IP/OBS HIGH 75: CPT | Mod: AI | Performed by: INTERNAL MEDICINE

## 2019-06-07 PROCEDURE — 84132 ASSAY OF SERUM POTASSIUM: CPT | Performed by: INTERNAL MEDICINE

## 2019-06-07 PROCEDURE — 85730 THROMBOPLASTIN TIME PARTIAL: CPT | Performed by: EMERGENCY MEDICINE

## 2019-06-07 PROCEDURE — 25000128 H RX IP 250 OP 636: Performed by: EMERGENCY MEDICINE

## 2019-06-07 PROCEDURE — 96360 HYDRATION IV INFUSION INIT: CPT | Mod: 59

## 2019-06-07 PROCEDURE — 96372 THER/PROPH/DIAG INJ SC/IM: CPT

## 2019-06-07 PROCEDURE — 83735 ASSAY OF MAGNESIUM: CPT | Performed by: INTERNAL MEDICINE

## 2019-06-07 PROCEDURE — 71260 CT THORAX DX C+: CPT

## 2019-06-07 PROCEDURE — 25800030 ZZH RX IP 258 OP 636: Performed by: EMERGENCY MEDICINE

## 2019-06-07 PROCEDURE — 84484 ASSAY OF TROPONIN QUANT: CPT

## 2019-06-07 PROCEDURE — 84484 ASSAY OF TROPONIN QUANT: CPT | Performed by: INTERNAL MEDICINE

## 2019-06-07 PROCEDURE — 25000132 ZZH RX MED GY IP 250 OP 250 PS 637: Performed by: EMERGENCY MEDICINE

## 2019-06-07 PROCEDURE — 93005 ELECTROCARDIOGRAM TRACING: CPT

## 2019-06-07 PROCEDURE — 25000125 ZZHC RX 250: Performed by: INTERNAL MEDICINE

## 2019-06-07 PROCEDURE — 80048 BASIC METABOLIC PNL TOTAL CA: CPT | Performed by: EMERGENCY MEDICINE

## 2019-06-07 PROCEDURE — 85610 PROTHROMBIN TIME: CPT | Performed by: EMERGENCY MEDICINE

## 2019-06-07 PROCEDURE — 36415 COLL VENOUS BLD VENIPUNCTURE: CPT | Performed by: INTERNAL MEDICINE

## 2019-06-07 PROCEDURE — 25000132 ZZH RX MED GY IP 250 OP 250 PS 637: Performed by: INTERNAL MEDICINE

## 2019-06-07 PROCEDURE — 99285 EMERGENCY DEPT VISIT HI MDM: CPT | Mod: 25

## 2019-06-07 PROCEDURE — 93005 ELECTROCARDIOGRAM TRACING: CPT | Mod: 76

## 2019-06-07 PROCEDURE — 85025 COMPLETE CBC W/AUTO DIFF WBC: CPT | Performed by: EMERGENCY MEDICINE

## 2019-06-07 PROCEDURE — 96361 HYDRATE IV INFUSION ADD-ON: CPT

## 2019-06-07 RX ORDER — ASPIRIN 81 MG/1
324 TABLET, CHEWABLE ORAL ONCE
Status: COMPLETED | OUTPATIENT
Start: 2019-06-07 | End: 2019-06-07

## 2019-06-07 RX ORDER — POTASSIUM CHLORIDE 1500 MG/1
20-40 TABLET, EXTENDED RELEASE ORAL
Status: DISCONTINUED | OUTPATIENT
Start: 2019-06-07 | End: 2019-06-08 | Stop reason: HOSPADM

## 2019-06-07 RX ORDER — NITROGLYCERIN 0.4 MG/1
0.4 TABLET SUBLINGUAL EVERY 5 MIN PRN
Status: COMPLETED | OUTPATIENT
Start: 2019-06-07 | End: 2019-06-07

## 2019-06-07 RX ORDER — ONDANSETRON 4 MG/1
4 TABLET, ORALLY DISINTEGRATING ORAL EVERY 6 HOURS PRN
Status: DISCONTINUED | OUTPATIENT
Start: 2019-06-07 | End: 2019-06-08 | Stop reason: HOSPADM

## 2019-06-07 RX ORDER — POTASSIUM CHLORIDE 1.5 G/1.58G
20-40 POWDER, FOR SOLUTION ORAL
Status: DISCONTINUED | OUTPATIENT
Start: 2019-06-07 | End: 2019-06-08 | Stop reason: HOSPADM

## 2019-06-07 RX ORDER — MAGNESIUM SULFATE HEPTAHYDRATE 40 MG/ML
4 INJECTION, SOLUTION INTRAVENOUS EVERY 4 HOURS PRN
Status: DISCONTINUED | OUTPATIENT
Start: 2019-06-07 | End: 2019-06-08 | Stop reason: HOSPADM

## 2019-06-07 RX ORDER — NITROGLYCERIN 0.4 MG/1
0.4 TABLET SUBLINGUAL EVERY 5 MIN PRN
Status: DISCONTINUED | OUTPATIENT
Start: 2019-06-07 | End: 2019-06-08 | Stop reason: HOSPADM

## 2019-06-07 RX ORDER — AMOXICILLIN 250 MG
2 CAPSULE ORAL 2 TIMES DAILY PRN
Status: DISCONTINUED | OUTPATIENT
Start: 2019-06-07 | End: 2019-06-08 | Stop reason: HOSPADM

## 2019-06-07 RX ORDER — SODIUM CHLORIDE 9 MG/ML
INJECTION, SOLUTION INTRAVENOUS CONTINUOUS
Status: DISCONTINUED | OUTPATIENT
Start: 2019-06-07 | End: 2019-06-07

## 2019-06-07 RX ORDER — NALOXONE HYDROCHLORIDE 0.4 MG/ML
.1-.4 INJECTION, SOLUTION INTRAMUSCULAR; INTRAVENOUS; SUBCUTANEOUS
Status: DISCONTINUED | OUTPATIENT
Start: 2019-06-07 | End: 2019-06-07

## 2019-06-07 RX ORDER — PROCHLORPERAZINE MALEATE 10 MG
10 TABLET ORAL EVERY 6 HOURS PRN
Status: DISCONTINUED | OUTPATIENT
Start: 2019-06-07 | End: 2019-06-08 | Stop reason: HOSPADM

## 2019-06-07 RX ORDER — METOPROLOL TARTRATE 25 MG/1
25 TABLET, FILM COATED ORAL 2 TIMES DAILY
Status: DISCONTINUED | OUTPATIENT
Start: 2019-06-07 | End: 2019-06-08 | Stop reason: HOSPADM

## 2019-06-07 RX ORDER — ACETAMINOPHEN 325 MG/1
650 TABLET ORAL EVERY 4 HOURS PRN
Status: DISCONTINUED | OUTPATIENT
Start: 2019-06-07 | End: 2019-06-07

## 2019-06-07 RX ORDER — ALUMINA, MAGNESIA, AND SIMETHICONE 2400; 2400; 240 MG/30ML; MG/30ML; MG/30ML
30 SUSPENSION ORAL EVERY 4 HOURS PRN
Status: DISCONTINUED | OUTPATIENT
Start: 2019-06-07 | End: 2019-06-08 | Stop reason: HOSPADM

## 2019-06-07 RX ORDER — LIDOCAINE 40 MG/G
CREAM TOPICAL
Status: DISCONTINUED | OUTPATIENT
Start: 2019-06-07 | End: 2019-06-08 | Stop reason: HOSPADM

## 2019-06-07 RX ORDER — OXYCODONE HYDROCHLORIDE 5 MG/1
5-10 TABLET ORAL
Status: DISCONTINUED | OUTPATIENT
Start: 2019-06-07 | End: 2019-06-08 | Stop reason: HOSPADM

## 2019-06-07 RX ORDER — POTASSIUM CHLORIDE 7.45 MG/ML
10 INJECTION INTRAVENOUS
Status: DISCONTINUED | OUTPATIENT
Start: 2019-06-07 | End: 2019-06-08 | Stop reason: HOSPADM

## 2019-06-07 RX ORDER — ACETAMINOPHEN 325 MG/1
650 TABLET ORAL EVERY 4 HOURS PRN
Status: DISCONTINUED | OUTPATIENT
Start: 2019-06-07 | End: 2019-06-08 | Stop reason: HOSPADM

## 2019-06-07 RX ORDER — AMOXICILLIN 250 MG
1 CAPSULE ORAL 2 TIMES DAILY PRN
Status: DISCONTINUED | OUTPATIENT
Start: 2019-06-07 | End: 2019-06-08 | Stop reason: HOSPADM

## 2019-06-07 RX ORDER — POTASSIUM CHLORIDE 29.8 MG/ML
20 INJECTION INTRAVENOUS
Status: DISCONTINUED | OUTPATIENT
Start: 2019-06-07 | End: 2019-06-08 | Stop reason: HOSPADM

## 2019-06-07 RX ORDER — ASPIRIN 325 MG
325 TABLET ORAL DAILY
Status: DISCONTINUED | OUTPATIENT
Start: 2019-06-08 | End: 2019-06-08 | Stop reason: HOSPADM

## 2019-06-07 RX ORDER — IOPAMIDOL 755 MG/ML
500 INJECTION, SOLUTION INTRAVASCULAR ONCE
Status: COMPLETED | OUTPATIENT
Start: 2019-06-07 | End: 2019-06-07

## 2019-06-07 RX ORDER — NITROGLYCERIN 0.4 MG/1
0.4 TABLET SUBLINGUAL EVERY 5 MIN PRN
Status: DISCONTINUED | OUTPATIENT
Start: 2019-06-07 | End: 2019-06-07

## 2019-06-07 RX ORDER — ONDANSETRON 2 MG/ML
4 INJECTION INTRAMUSCULAR; INTRAVENOUS EVERY 6 HOURS PRN
Status: DISCONTINUED | OUTPATIENT
Start: 2019-06-07 | End: 2019-06-08 | Stop reason: HOSPADM

## 2019-06-07 RX ORDER — PROCHLORPERAZINE 25 MG
25 SUPPOSITORY, RECTAL RECTAL EVERY 12 HOURS PRN
Status: DISCONTINUED | OUTPATIENT
Start: 2019-06-07 | End: 2019-06-08 | Stop reason: HOSPADM

## 2019-06-07 RX ORDER — NALOXONE HYDROCHLORIDE 0.4 MG/ML
.1-.4 INJECTION, SOLUTION INTRAMUSCULAR; INTRAVENOUS; SUBCUTANEOUS
Status: DISCONTINUED | OUTPATIENT
Start: 2019-06-07 | End: 2019-06-08 | Stop reason: HOSPADM

## 2019-06-07 RX ORDER — MORPHINE SULFATE 4 MG/ML
2 INJECTION, SOLUTION INTRAMUSCULAR; INTRAVENOUS
Status: DISCONTINUED | OUTPATIENT
Start: 2019-06-07 | End: 2019-06-08 | Stop reason: HOSPADM

## 2019-06-07 RX ORDER — SIMVASTATIN 20 MG
20 TABLET ORAL EVERY EVENING
Status: DISCONTINUED | OUTPATIENT
Start: 2019-06-07 | End: 2019-06-08 | Stop reason: HOSPADM

## 2019-06-07 RX ORDER — POTASSIUM CL/LIDO/0.9 % NACL 10MEQ/0.1L
10 INTRAVENOUS SOLUTION, PIGGYBACK (ML) INTRAVENOUS
Status: DISCONTINUED | OUTPATIENT
Start: 2019-06-07 | End: 2019-06-08 | Stop reason: HOSPADM

## 2019-06-07 RX ORDER — ACETAMINOPHEN 650 MG/1
650 SUPPOSITORY RECTAL EVERY 4 HOURS PRN
Status: DISCONTINUED | OUTPATIENT
Start: 2019-06-07 | End: 2019-06-08 | Stop reason: HOSPADM

## 2019-06-07 RX ADMIN — SIMVASTATIN 20 MG: 20 TABLET, FILM COATED ORAL at 19:42

## 2019-06-07 RX ADMIN — POTASSIUM CHLORIDE 40 MEQ: 1500 TABLET, EXTENDED RELEASE ORAL at 17:00

## 2019-06-07 RX ADMIN — ASPIRIN 81 MG 324 MG: 81 TABLET ORAL at 14:47

## 2019-06-07 RX ADMIN — POTASSIUM CHLORIDE 20 MEQ: 1500 TABLET, EXTENDED RELEASE ORAL at 19:38

## 2019-06-07 RX ADMIN — FAMOTIDINE 20 MG: 10 INJECTION INTRAVENOUS at 19:43

## 2019-06-07 RX ADMIN — METOPROLOL TARTRATE 25 MG: 25 TABLET, FILM COATED ORAL at 21:13

## 2019-06-07 RX ADMIN — SODIUM CHLORIDE: 9 INJECTION, SOLUTION INTRAVENOUS at 12:52

## 2019-06-07 RX ADMIN — SODIUM CHLORIDE 60 ML: 9 INJECTION, SOLUTION INTRAVENOUS at 13:17

## 2019-06-07 RX ADMIN — ENOXAPARIN SODIUM 105 MG: 120 INJECTION SUBCUTANEOUS at 23:55

## 2019-06-07 RX ADMIN — NITROGLYCERIN 0.4 MG: 0.4 TABLET SUBLINGUAL at 12:52

## 2019-06-07 RX ADMIN — IOPAMIDOL 80 ML: 755 INJECTION, SOLUTION INTRAVENOUS at 13:17

## 2019-06-07 RX ADMIN — NITROGLYCERIN 0.4 MG: 0.4 TABLET SUBLINGUAL at 15:09

## 2019-06-07 RX ADMIN — ENOXAPARIN SODIUM 100 MG: 100 INJECTION SUBCUTANEOUS at 15:20

## 2019-06-07 RX ADMIN — NITROGLYCERIN 0.4 MG: 0.4 TABLET SUBLINGUAL at 14:22

## 2019-06-07 RX ADMIN — NITROGLYCERIN 0.4 MG: 0.4 TABLET SUBLINGUAL at 14:11

## 2019-06-07 ASSESSMENT — MIFFLIN-ST. JEOR
SCORE: 1919.94
SCORE: 1960.76

## 2019-06-07 ASSESSMENT — ENCOUNTER SYMPTOMS
NAUSEA: 1
SHORTNESS OF BREATH: 1
DIAPHORESIS: 1

## 2019-06-07 ASSESSMENT — ACTIVITIES OF DAILY LIVING (ADL): ADLS_ACUITY_SCORE: 12

## 2019-06-07 NOTE — PHARMACY-ADMISSION MEDICATION HISTORY
Admission medication history interview status for this patient is complete. See [facility] admission navigator for allergy information, prior to admission medications and immunization status.     Medication history interview source(s):Patient  Medication history resources (including written lists, pill bottles, clinic record): [facility]  Primary pharmacy:[facility] in [place]    Changes made to [facility] medication list:  Added: None  Deleted: oxycodone, miralax   Changed: none    Actions taken by pharmacist (provider contacted, etc):None     Additional medication history information:None    Medication reconciliation/reorder completed by provider prior to medication history? No    Do you take OTC medications (eg tylenol, ibuprofen, fish oil, eye/ear drops, etc)? Yes      Prior to Admission medications    Medication Sig Last Dose Taking? Auth Provider   lisinopril-hydrochlorothiazide (PRINZIDE,ZESTORETIC) 20-25 MG per tablet Take 1 tablet by mouth daily 6/7/2019 at AM Yes Reported, Patient   OMEPRAZOLE PO Take 20 mg by mouth every morning 6/7/2019 at AM Yes Reported, Patient   cetirizine (ZYRTEC) 10 MG tablet Take 10 mg by mouth daily as needed for allergies More than a month at Unknown time  Unknown, Entered By History   fluticasone (FLONASE) 50 MCG/ACT nasal spray Spray 2 sprays into both nostrils daily as needed for rhinitis or allergies More than a month at Unknown time  Unknown, Entered By History

## 2019-06-07 NOTE — ED NOTES
[facility]  ED Nurse Handoff Report    Isacc Cool is a 54 year old male   ED Chief complaint: Chest Pain and Shortness of Breath  . ED Diagnosis:   Final diagnoses:   Pulmonary nodules   Acute chest pain   Unstable angina (H) - rule out     Allergies: No Known Allergies    Code Status: Full Code  Activity level - Baseline/Home:  Independent. Activity Level - Current:   Independent. Lift room needed: No. Bariatric: No   Needed: No   Isolation: No. Infection: Not Applicable.     Vital Signs:   Vitals:    06/07/19 1415 06/07/19 1430 06/07/19 1445 06/07/19 1500   BP: 107/80 115/65 122/80 127/82   Pulse: 92 83 75 76   Resp: 18 9 11 14   Temp:       TempSrc:       SpO2: 95% 95% 97% 98%   Weight:       Height:           Cardiac Rhythm:  ,      Pain level: 0-10 Pain Scale: 8  Patient confused: No. Patient Falls Risk: No.   Elimination Status: Has voided   Patient Report - Initial Complaint: Chest Pain accompanied by SOB. Focused Assessment: HPI   Isacc Cool is a 54 year old male with a history of hypertension and hyperlipidemia who presents with chest pain and shortness of breath. The patient states that 25 minutes ago he felt a sudden onset of chest pain that was an 8/10. He states that he feels a heaviness in his chest that has been constant since that time. He states his pain did not improve with TUMs though he notes the pain is now a 6/10.  He notes that the pain started to radiate to his neck and left shoulder with tingling. The patient notes he had associated diaphoresis and shortness of breath. The patient denies a history of CKD. The patient notes he has felt nauseated for several days. The patient is not on Viagra.    Tests Performed: BMP, CBC, INR, PTT, TROP POC, chest CT. Abnormal Results:   Labs Ordered and Resulted from Time of ED Arrival Up to the Time of Departure from the ED   BASIC METABOLIC PANEL - Abnormal; Notable for the following components:       Result Value    Sodium  130 (*)     Potassium 3.3 (*)     Glucose 110 (*)     All other components within normal limits   CBC WITH PLATELETS DIFFERENTIAL   INR   PARTIAL THROMBOPLASTIN TIME   TROPONIN POCT   See imaging.   Treatments provided: IVF, labs/imaging, nitroglycerine x 3, subcutaneous lovenox, ASA.   Family Comments: Spouse at bedside.   OBS brochure/video discussed/provided to patient:  Yes  ED Medications:   Medications   sodium chloride 0.9% infusion ( Intravenous New Bag 6/7/19 1252)   enoxaparin (LOVENOX) injection 100 mg (has no administration in time range)   nitroGLYcerin (NITROSTAT) sublingual tablet 0.4 mg (has no administration in time range)   nitroGLYcerin (NITROSTAT) sublingual tablet 0.4 mg (0.4 mg Sublingual Given 6/7/19 1422)   iopamidol (ISOVUE-370) solution 500 mL (80 mLs Intravenous Given 6/7/19 1317)   0.9% sodium chloride BOLUS (0 mLs Intravenous Stopped 6/7/19 1318)   aspirin (ASA) chewable tablet 324 mg (324 mg Oral Given 6/7/19 1447)     Drips infusing:  No  For the majority of the shift, the patient's behavior Green. Interventions performed were n/a.     Severe Sepsis OR Septic Shock Diagnosis Present: No      ED Nurse Name/Phone Number: Jorge Wu,   3:05 PM  RECEIVING UNIT ED HANDOFF REVIEW    Above ED Nurse Handoff Report was reviewed: Yes  Reviewed by: Mita Wu on June 7, 2019 at 4:10 PM

## 2019-06-07 NOTE — ED PROVIDER NOTES
History     Chief Complaint:  Chest Pain and Shortness of Breath    HPI   Isacc Cool is a 54 year old male with a history of hypertension and hyperlipidemia who presents with chest pain and shortness of breath. The patient states that 25 minutes ago he felt a sudden onset of chest pain that was an 8/10. He states that he feels a heaviness in his chest that has been constant since that time. He states his pain did not improve with TUMs though he notes the pain is now a 6/10.  He notes that the pain started to radiate to his neck and left shoulder with tingling. The patient notes he had associated diaphoresis and shortness of breath. The patient denies a history of CKD. The patient notes he has felt nauseated for several days. The patient is not on Viagra.     Cardiac/PE/DVT Risk Factors:  History of hypertension - yes  History of hyperlipidemia - yes  History of diabetes - prediabetic    Allergies:  No Known Allergies     Medications:    Lisinopril hydrochlorothiazide     Past Medical History:    GERD  Hypertension  Numbness and tingling  Chronic pain  Meningitis   Cervical spinal stenosis     Past Surgical History:    Arthroscopy knee  BHA implants  Decompression, fusion cervical anterior three levels  Foraminotomy cervical posterior   Hypertension  Hyperlipidemia  GERD    Family History:    Brother: hypertension    Social History:  Smoking Status: Never Smoker  Smokeless Tobacco: Former user, chew  Alcohol Use: Positive  Drug use: no  Marital Status:       Review of Systems   Constitutional: Positive for diaphoresis.   Respiratory: Positive for shortness of breath.    Cardiovascular: Positive for chest pain.   Gastrointestinal: Positive for nausea.   Neurological:        Tingling   All other systems reviewed and are negative.    Physical Exam     Patient Vitals for the past 24 hrs:   BP Temp Temp src Pulse Heart Rate Resp SpO2 Height Weight   06/07/19 1400 116/62 -- -- 74 73 14 95 % -- --   06/07/19  "1300 -- -- -- -- -- 26 95 % -- --   06/07/19 1245 152/80 -- -- 92 89 16 95 % -- --   06/07/19 1241 (!) 145/100 98.8  F (37.1  C) Oral -- -- -- -- -- --   06/07/19 1240 -- -- -- -- 82 -- 96 % 1.803 m (5' 11\") 104.8 kg (231 lb)     Physical Exam  General: Patient is alert and interactive when I enter the room  Head:  The scalp, face, and head appear normal  Eyes:  The pupils are equal, round, and reactive to light    Conjunctivae and sclerae are normal  ENT:    External acoustic canals are normal    The oropharynx is normal without erythema.     Uvula is in the midline  Neck:  Normal range of motion  CV:  Regular rate. S1/S2. No murmurs.     Peripheral pulses including radial pulses are symmetric  Resp:  Lungs are clear without wheezes or rales. No distress  GI:  Abdomen is soft, no rigidity, guarding, or rebound    No distension. No tenderness to palpation in any quadrant.     MS:  Normal tone. Joints grossly normal without effusions.     No asymmetric leg swelling, calf or thigh tenderness.      Normal motor assessment of all extremities.    Chest wall is possibly tender to palpation.    Skin:  No rash or lesions noted. Normal capillary refill noted  Neuro: Speech is normal and fluent. Face is symmetric.     Moving all extremities well.   Psych:  Awake. Alert.  Normal affect.  Appropriate interactions.  Lymph: No anterior cervical lymphadenopathy noted  Emergency Department Course   ECG:  ECG taken at 1233, ECG read at 1235  Normal sinus rhythm  nonspecific ST and T wave abnormality  Abnormal ECG  Rate 80 bpm. WY interval 146 ms. QRS duration 88 ms. QT/QTc 374/431 ms. P-R-T axes 39 47 66.    ECG:  ECG taken at 1437, ECG read at 1437  Normal sinus rhythm  Nonspecific ST and T wave abnormalities  Abnormal ECG  Rate 84 bpm. WY interval 150 ms. QRS duration 88 ms. QT/QTc 376/444 ms. P-R-T axes 30 32 34.  Worsening ST changes laterally compared to prior EKG.     Imaging:  Radiology findings were communicated with the " patient who voiced understanding of the findings.    Chest CT w IV contrast only, TRAUMA / DISSECTION  1. No evidence for aortic dissection.  2. No evidence for large or central pulmonary embolus; suboptimal  enhancement or sensitivity in evaluating for smaller pulmonary emboli.  3. No pleural effusion or acute pulmonary disease. Indeterminate 0.4  cm left lower lobe nodule and subcentimeter focal groundglass  posterior right lower lobe opacity. 12 month followup CT is  recommended if the patient has known risk factors for malignancy such  as smoking or history of malignancy. In the absence of risk factors,  no followup for this tiny nodule is routinely recommended.   Reading per radiology    Laboratory:  Laboratory findings were communicated with the patient who voiced understanding of the findings.    Troponin POCT (Collected 1247): 0.01    CBC: WBC 8.1, HGB 14.9,   BMP: (L), potassium 3.3(L), glucose 110(H) o/w WNL (Creatinine 1.12)  INR: 0.99  PTT: 32    Interventions:  1252  mL/hr  IV  1252 Nitroglycerin 0.4 mg Sublingual   1411 Nitroglycerin 0.4 mg Sublingual   1422 Nitroglycerin 0.4 mg Sublingual   1447 Nitroglycerin 0.4 mg Sublingual  1447 Asprin 324 mg Oral  Lovenox 100 mg Subcutaneous     Emergency Department Course:  Nursing notes and vitals reviewed.  1241 IV was inserted and blood was drawn for laboratory testing, results above.    1245 I performed an exam of the patient as documented above.     1247 Troponin POCT obtained, results above.     1303 The patient was sent for a CT Chest while in the emergency department, results above.     1420 I returned to check on the patient. He notes he is feeling better though not improved.       I spoke Hospitalist service from [facility] regarding patient's presentation, findings, and plan of care.    Impression & Plan      Medical Decision Making:  Isacc Cool is a 54 year old male who presents with chest pain.  His history and risk  factor analysis are significant for hyperlipidemia, hypertension, and prediabetes.  The workup in the Emergency Department (see above for cardiac enzymes and EKG)  Is concerning as he has developed some lateral ST changes in his second EKG and I am worried he could have ACS/unstable angina.       My clinical suspicion of acute coronary syndrome is high enough to warrant further therapy and investigation.  I will admit the patient  to medicine service for further workup.  The patient is almost pain free after nitroglycerin.  After discussing with him the risks and benefits of blood thinners, lovenox given suspicion of acute coronary syndrome.     There is no clinical, laboratory, or radiographic evidence of pulmonary embolism, AAA, aortic dissection, pneumonia or pneumothorax.     He agrees to be admitted and all questions were answered. Pulmonary nodules to be reimaged as outpatient per primary    Diagnosis:    ICD-10-CM    1. Acute chest pain R07.9    2. Pulmonary nodules R91.8    3. Unstable angina (H) I20.0     rule out      Disposition:   The patient is admitted   Scribe Disclosure:  I, Velia Hilaria, am serving as a scribe at 12:49 PM on 6/7/2019 to document services personally performed by Vignesh Saleh MD based on my observations and the provider's statements to me.  [facility] EMERGENCY DEPARTMENT       Vignesh Saleh MD  06/07/19 0674

## 2019-06-07 NOTE — PLAN OF CARE
A/O. Denies chest pain/shortness of breath at this time. Tele: SR. Skin is flushed, warm. PIV saline locked. Regular diet. Will be NPO at midnight. Cardiology consulted. K+ replacement initiated this shift. Up independently in room. Will continue with POC.

## 2019-06-08 ENCOUNTER — APPOINTMENT (OUTPATIENT)
Dept: CARDIOLOGY | Facility: CLINIC | Age: 54
End: 2019-06-08
Attending: INTERNAL MEDICINE
Payer: COMMERCIAL

## 2019-06-08 VITALS
BODY MASS INDEX: 32.47 KG/M2 | DIASTOLIC BLOOD PRESSURE: 77 MMHG | OXYGEN SATURATION: 98 % | WEIGHT: 231.9 LBS | RESPIRATION RATE: 18 BRPM | HEIGHT: 71 IN | SYSTOLIC BLOOD PRESSURE: 122 MMHG | TEMPERATURE: 98.9 F | HEART RATE: 69 BPM

## 2019-06-08 PROBLEM — R07.9 CHEST PAIN: Status: ACTIVE | Noted: 2019-06-08

## 2019-06-08 LAB
ALBUMIN SERPL-MCNC: 3.5 G/DL (ref 3.4–5)
ALP SERPL-CCNC: 52 U/L (ref 40–150)
ALT SERPL W P-5'-P-CCNC: 158 U/L (ref 0–70)
ANION GAP SERPL CALCULATED.3IONS-SCNC: 5 MMOL/L (ref 3–14)
AST SERPL W P-5'-P-CCNC: 178 U/L (ref 0–45)
BILIRUB DIRECT SERPL-MCNC: 0.1 MG/DL (ref 0–0.2)
BILIRUB SERPL-MCNC: 0.4 MG/DL (ref 0.2–1.3)
BUN SERPL-MCNC: 14 MG/DL (ref 7–30)
CALCIUM SERPL-MCNC: 8.4 MG/DL (ref 8.5–10.1)
CHLORIDE SERPL-SCNC: 103 MMOL/L (ref 94–109)
CHOLEST SERPL-MCNC: 119 MG/DL
CO2 SERPL-SCNC: 28 MMOL/L (ref 20–32)
CREAT SERPL-MCNC: 1.02 MG/DL (ref 0.66–1.25)
GFR SERPL CREATININE-BSD FRML MDRD: 84 ML/MIN/{1.73_M2}
GLUCOSE SERPL-MCNC: 101 MG/DL (ref 70–99)
HDLC SERPL-MCNC: 49 MG/DL
INTERPRETATION ECG - MUSE: NORMAL
LDLC SERPL CALC-MCNC: 54 MG/DL
MAGNESIUM SERPL-MCNC: 2.2 MG/DL (ref 1.6–2.3)
NONHDLC SERPL-MCNC: 70 MG/DL
POTASSIUM SERPL-SCNC: 3.3 MMOL/L (ref 3.4–5.3)
POTASSIUM SERPL-SCNC: 4.5 MMOL/L (ref 3.4–5.3)
PROT SERPL-MCNC: 7.4 G/DL (ref 6.8–8.8)
SODIUM SERPL-SCNC: 136 MMOL/L (ref 133–144)
TRIGL SERPL-MCNC: 79 MG/DL

## 2019-06-08 PROCEDURE — 80048 BASIC METABOLIC PNL TOTAL CA: CPT | Performed by: INTERNAL MEDICINE

## 2019-06-08 PROCEDURE — 80076 HEPATIC FUNCTION PANEL: CPT | Performed by: INTERNAL MEDICINE

## 2019-06-08 PROCEDURE — 40000893 ZZH STATISTIC PT IP EVAL DEFER: Performed by: PHYSICAL THERAPIST

## 2019-06-08 PROCEDURE — 25000125 ZZHC RX 250: Performed by: INTERNAL MEDICINE

## 2019-06-08 PROCEDURE — 25000128 H RX IP 250 OP 636: Performed by: INTERNAL MEDICINE

## 2019-06-08 PROCEDURE — 93306 TTE W/DOPPLER COMPLETE: CPT | Mod: 26 | Performed by: INTERNAL MEDICINE

## 2019-06-08 PROCEDURE — 80061 LIPID PANEL: CPT | Performed by: INTERNAL MEDICINE

## 2019-06-08 PROCEDURE — 99217 ZZC OBSERVATION CARE DISCHARGE: CPT | Performed by: INTERNAL MEDICINE

## 2019-06-08 PROCEDURE — 36415 COLL VENOUS BLD VENIPUNCTURE: CPT | Performed by: INTERNAL MEDICINE

## 2019-06-08 PROCEDURE — 25000132 ZZH RX MED GY IP 250 OP 250 PS 637: Performed by: INTERNAL MEDICINE

## 2019-06-08 PROCEDURE — 40000264 ECHOCARDIOGRAM COMPLETE

## 2019-06-08 PROCEDURE — G0378 HOSPITAL OBSERVATION PER HR: HCPCS

## 2019-06-08 PROCEDURE — 25500064 ZZH RX 255 OP 636: Performed by: INTERNAL MEDICINE

## 2019-06-08 RX ADMIN — POTASSIUM CHLORIDE 20 MEQ: 1500 TABLET, EXTENDED RELEASE ORAL at 02:38

## 2019-06-08 RX ADMIN — HUMAN ALBUMIN MICROSPHERES AND PERFLUTREN 3 ML: 10; .22 INJECTION, SOLUTION INTRAVENOUS at 10:18

## 2019-06-08 RX ADMIN — ASPIRIN 325 MG ORAL TABLET 325 MG: 325 PILL ORAL at 08:02

## 2019-06-08 RX ADMIN — ENOXAPARIN SODIUM 105 MG: 120 INJECTION SUBCUTANEOUS at 12:20

## 2019-06-08 RX ADMIN — FAMOTIDINE 20 MG: 10 INJECTION INTRAVENOUS at 08:02

## 2019-06-08 RX ADMIN — POTASSIUM CHLORIDE 40 MEQ: 1500 TABLET, EXTENDED RELEASE ORAL at 00:34

## 2019-06-08 ASSESSMENT — ACTIVITIES OF DAILY LIVING (ADL)
ADLS_ACUITY_SCORE: 12

## 2019-06-08 ASSESSMENT — MIFFLIN-ST. JEOR: SCORE: 1924.02

## 2019-06-08 NOTE — CONSULTS
[facility]    Cardiology Consultation     Date of Admission:  6/7/2019    Primary Care Physician   Wm Alemannson     Consult Date:  06/08/2019      REASON FOR CONSULTATION:  Chest pain.      REFERRING PHYSICIAN:  Hospitalist Service.      IMPRESSION:   1.  Noncardiac chest pains, most likely due to gastroesophageal reflux disease.   2.  Gastroesophageal reflux disease.   3.  Heavy alcohol use.   4.  Hypertension, controlled.      This gentleman's presenting chest pain does sound cardiac in origin and it was relieved by nitroglycerin.  However, gastroesophageal reflux disease could also produce the same symptoms with relief by nitroglycerin.  I am reassured by several factors.  He had a normal bicycle stress test done at the [facility] as part of an executive health physical just a month and a half ago.  This was reportedly normal per patient.  He has had several hours of chest discomfort and troponins are completely negative. There were no acute EKG changes.  In addition, an echocardiogram shows normal left ventricular wall motion and ejection fraction without any valvular lesions.      Overall, I think this patient can be safely discharged.  For further evaluation, I did recommend CT coronary angiography which may be done as an outpatient and our office will contact him with regards to setting this up.      Thank you for asking me to see this very pleasant gentleman.      HISTORY OF PRESENT ILLNESS:  A very pleasant 54-year-old gentleman with cardiac risk factors of hypertension who presents with sudden onset of left-sided chest discomfort with radiation to his jaw and down his arm.  No nausea, vomiting, diaphoresis or SOB.  He was in his office, chatting with his friends when this happened.  He described it as like an initial sharp pain lasting for a few minutes and then there was a pressure sensation in his chest which lasted for hours.  This was not relieved until he came to the emergency room  and was given sublingual nitroglycerin. He denies history of exertional chest symptoms.  He does have GERD but symptoms are dissimilar.     FAMILY HISTORY:  Negative for premature atherosclerotic disease.        SOCIAL HISTORY:  He denies illicit drug use. He does not smoke.        Past Medical History   I have reviewed this patient's medical history and updated it with pertinent information if needed.   Past Medical History:   Diagnosis Date     Gastro-oesophageal reflux disease      Hypertension      Numbness and tingling     right arm     Other chronic pain        Past Surgical History   I have reviewed this patient's surgical history and updated it with pertinent information if needed.  Past Surgical History:   Procedure Laterality Date     ARTHROSCOPY KNEE Right     knee     BACK SURGERY      BHA implants     DECOMPRESSION, FUSION CERVICAL ANTERIOR THREE+ LEVELS, COMBINED N/A 6/24/2015    Procedure: COMBINED DECOMPRESSION, FUSION CERVICAL ANTERIOR THREE+ LEVELS;  Surgeon: Reinier Penaloza MD;  Location: [facility] OR     FORAMINOTOMY CERVICAL POSTERIOR ONE LEVEL Right 10/21/2015    Procedure: FORAMINOTOMY CERVICAL POSTERIOR ONE LEVEL;  Surgeon: Reinier Penaloza MD;  Location: [facility] OR       Prior to Admission Medications   Prior to Admission Medications   Prescriptions Last Dose Informant Patient Reported? Taking?   OMEPRAZOLE PO 6/7/2019 at AM  Yes Yes   Sig: Take 20 mg by mouth every morning   cetirizine (ZYRTEC) 10 MG tablet More than a month at Unknown time  Yes No   Sig: Take 10 mg by mouth daily as needed for allergies   fluticasone (FLONASE) 50 MCG/ACT nasal spray More than a month at Unknown time  Yes No   Sig: Spray 2 sprays into both nostrils daily as needed for rhinitis or allergies   lisinopril-hydrochlorothiazide (PRINZIDE,ZESTORETIC) 20-25 MG per tablet 6/7/2019 at AM  Yes Yes   Sig: Take 1 tablet by mouth daily      Facility-Administered Medications: None     Allergies   No Known  Allergies    Social History   I have reviewed this patient's social history and updated it with pertinent information if needed. Isacc Cool  reports that he has never smoked. He quit smokeless tobacco use about 11 years ago. His smokeless tobacco use included chew. He reports that he drinks alcohol. He reports that he does not use drugs.    Family History   I have reviewed this patient's family history and updated it with pertinent information if needed.   No family history on file.    Review of Systems   The 10 point Review of Systems is negative other than noted in the HPI or here.     Physical Exam   Temp: 98.9  F (37.2  C) Temp src: Oral BP: 122/77   Heart Rate: 54 Resp: 18 SpO2: 98 % O2 Device: None (Room air)    Vital Signs with Ranges  Temp:  [98.9  F (37.2  C)] 98.9  F (37.2  C)  Heart Rate:  [54] 54  Resp:  [18] 18  BP: (122)/(77) 122/77  SpO2:  [98 %] 98 %  231 lbs 14.4 oz    GENERAL:  Very pleasant gentleman in no acute distress.   VITAL SIGNS:  Stable with blood pressure 122/77.   HEENT:  Examination is unremarkable.  Mucous membranes are normal.  He has no clubbing, no peripheral central cyanosis.   NECK:  No raised JVP, no thyromegaly.   CARDIAC:  Cardiac apex is not palpable.  Heart sounds are normal.   CHEST:  Symmetrical expansion without the use of accessory muscles.  Breath sounds are normal.   ABDOMEN:  Soft and nontender.  No hepatosplenomegaly.  The abdominal aorta is not palpable.   EXTREMITIES:  No significant peripheral edema.  Foot pulses are preserved and intact.   CENTRAL NERVOUS SYSTEM:  Grossly intact.   PSYCHIATRIC:  Mood appears normal.      LABORATORY INVESTIGATIONS:  EKG shows a sinus rhythm with nonspecific ST-T wave changes.  Troponins x 2 are completely negative.  , .  Electrolytes within normal limits.  CBC also within normal limits.        Data   Results for orders placed or performed during the hospital encounter of 06/07/19 (from the past 24 hour(s))    Echocardiogram Complete    Narrative    [id number]  [id number]  [id number]  [id number]^DARIA^AL^AIDASELENA           [facility]  Echocardiography Laboratory  [address] [place] [address]        Name: MARINA KRISHNAMURTHY  MRN: [id number]  : 1965  Study Date: 2019 09:53 AM  Age: 54 yrs  Gender: Male  Patient Location: [facility]  Reason For Study: Chest Pain  Ordering Physician: BETH HUFF  Referring Physician: Wm Doty  Performed By: Bernadette Hernandez     BSA: 2.3 m2  Height: 71 in  Weight: 240 lb  HR: 57  BP: 126/59 mmHg  _____________________________________________________________________________  __        Procedure  Complete Portable Echo Adult.  _____________________________________________________________________________  __        Interpretation Summary     The visual ejection fraction is estimated at 55-60%.  Normal left ventricular wall motion  Normal transthoracic echocardiogram.  _____________________________________________________________________________  __        Left Ventricle  The left ventricle is normal in structure, function and size. The visual  ejection fraction is estimated at 55-60%. Left ventricular diastolic function  is normal. Normal left ventricular wall motion.     Right Ventricle  The right ventricle is normal in structure, function and size.     Atria  Normal left atrial size. Right atrial size is normal.     Mitral Valve  The mitral valve is normal in structure and function.        Tricuspid Valve  Normal tricuspid valve.     Aortic Valve  The aortic valve is normal in structure and function.     Pulmonic Valve  Normal pulmonic valve.     Vessels  The aortic root is normal size. The ascending aorta is Borderline dilated.     Pericardium  There is no pericardial effusion.        Rhythm  Sinus rhythm was noted.  _____________________________________________________________________________  __  MMode/2D Measurements & Calculations  IVSd: 1.0 cm      LVIDd: 4.8 cm  LVIDs: 3.2 cm  LVPWd: 0.97 cm  FS: 34.2 %  LV mass(C)d: 171.0 grams  LV mass(C)dI: 75.0 grams/m2  Ao root diam: 3.5 cm  LA dimension: 3.8 cm  asc Aorta Diam: 3.8 cm  LA/Ao: 1.1  LVOT diam: 2.2 cm  LVOT area: 3.8 cm2  LA Volume (BP): 60.8 ml  LA Volume Index (BP): 26.7 ml/m2  RWT: 0.40           Doppler Measurements & Calculations  MV E max ana: 76.0 cm/sec  MV A max ana: 56.0 cm/sec  MV E/A: 1.4  MV dec time: 0.13 sec  LV V1 max P.1 mmHg  LV V1 max: 142.4 cm/sec  LV V1 VTI: 28.6 cm  CO(LVOT): 6.1 l/min  CI(LVOT): 2.7 l/min/m2  SV(LVOT): 109.5 ml  SI(LVOT): 48.1 ml/m2  TR max ana: 231.2 cm/sec  TR max P.4 mmHg  E/E' av.7  Lateral E/e': 6.1  Medial E/e': 7.3           _____________________________________________________________________________  __           Report approved by: Beau Riddle 2019 12:35 PM                 LE SHERWOOD MD, [facility]             D: 2019   T: 2019   MT: BRADLEY      Name:     MARINA KRISHNAMURTHY   MRN:      [id number]        Account:       [id number]   :      1965           Consult Date:  2019      Document: [id number]

## 2019-06-08 NOTE — PLAN OF CARE
Vss. Hr mikaela. BB held.   Tele:sb.  LS:clear  GI: bs+, tolerated regular diet for lunch.  : voids in toilet with no problems.  Skin: flushed colored  Activity: up indep in room.  Pain: denies pain  Plan: pt discharge to home. discharge instructions reviewed with pt and wife. Both understood all instructions, meds and F/U appts needed. 1 new OTC med from this hospital stay which pt needs to get from a pharmacy. All belongings taken with pt.

## 2019-06-08 NOTE — PLAN OF CARE
Discharge Planner PT   Patient plan for discharge: Home today  Current status: Cardiac rehab orders received. Per chart review, chest pain determined to likely be unrelated to cardiac disease. Additionally, pt mobilizing indep at this time. No skilled cardiac rehab needs identified at this time. Will complete orders.   Barriers to return to prior living situation: None  Recommendations for discharge: Home  Rationale for recommendations: Is at/near baseline for mobility.        Entered by: Yodit Prado 06/08/2019 2:11 PM

## 2019-06-08 NOTE — H&P
[facility]  Hospitalist Admission Note  Name: Isacc Cool    MRN: [id number]  YOB: 1965    Age: 54 year old  Date of admission: 6/7/2019  Primary care provider: Wm Doty            Assessment and Plan:   Isacc Cool is a 54 year old male with known prior history of hypertension on HCTZ/lisinopril, EtOH daily drinker of approximately 4 drinks per day, GERD, who was in his usual state of health until earlier today when he started having complaints of chest pain, pressure while he is in the office telling a story to his office mates    1.  Chest pain, chest pressure high suspicion for ACS/unstable angina  2.  Hypertension  3.  Obesity with BMI of 33  4.  Daily EtOH drinker, approximately 4-5 drinks per day  -Denies any prior withdrawal symptomatology  5.  GERD  6.  Hyponatremia at 130    Admit as inpatient.  At risk for clinical deterioration.  Continue with cardiac telemetry monitoring.  Receive anticoagulation with Lovenox earlier which will be continued.  Aspirin given the ready.  If blood pressures permitting and heart rate is permitting as well then will start with metoprolol 25 mg twice daily.  Reconsider changing his combination high blood pressure medications of HCTZ/lisinopril in the setting of this hyponatremia and patient also drinks on a regular basis.  We will resume lisinopril if blood pressure will be permitting.  Echocardiogram requested.  Formal cardiology evaluation requested.  Keep him n.p.o. after midnight.  Check LFTs and lipid panel.  Statins initiated.  Recheck BMP in the morning    Code status: Full code  Admit to inpatient  Prophylaxis: On Lovenox  Disposition: Home discharge in the next 2 days          Chief Complaint:   Chest pain a few hours duration       Source of Information:   Patient with good reliability  Discussion with ED physician  Review of E chart records         History of Present Illness:   Isacc Cool is a 54 year old male with  known prior history of hypertension on HCTZ/lisinopril, EtOH daily drinker of approximately 4 drinks per day, GERD, who was in his usual state of health until earlier today when he started having complaints of chest pain, pressure while he is in the office telling a story to his office mates.  He described as mostly pressure-like, feeling of heaviness with maximum intensity of 8 out of 10 with some radiation to his left shoulder and accompanying diaphoresis.  He denies any vomiting but earlier also felt nauseous.  No other accompanying symptomatology of fevers, chills, coughing spells, back pain, syncope, urinary symptoms, diarrhea or bleeding tendencies.  He took some Tums but did not have much relief of symptomatology prompting him to be brought to the emergency room for further management care.  Upon initial presentation he was provided with nitroglycerin sublingual which afforded relief of the chest pressure.  Rest of the hemodynamics were stable.  Concerns for ACS and started on anticoagulation with Lovenox and was given with aspirin at the ED.  No further recurrence of the chest pain.  No hypoxia noted and remained afebrile.  CT of the chest was pursued and ruled out PE.  His case was referred to us for further management care hence this hospitalization.  During the time my exam he remained hemodynamically stable, no further recurrence of any chest pressure and denies any new complaints.           Past Medical History:     Past Medical History:   Diagnosis Date     Gastro-oesophageal reflux disease      Hypertension      Numbness and tingling     right arm     Other chronic pain              Past Surgical History:     Past Surgical History:   Procedure Laterality Date     ARTHROSCOPY KNEE Right     knee     BACK SURGERY      BHA implants     DECOMPRESSION, FUSION CERVICAL ANTERIOR THREE+ LEVELS, COMBINED N/A 6/24/2015    Procedure: COMBINED DECOMPRESSION, FUSION CERVICAL ANTERIOR THREE+ LEVELS;  Surgeon:  "Reinier Penaloza MD;  Location: [facility] OR     FORAMINOTOMY CERVICAL POSTERIOR ONE LEVEL Right 10/21/2015    Procedure: FORAMINOTOMY CERVICAL POSTERIOR ONE LEVEL;  Surgeon: Reinier Penaloza MD;  Location: [facility] OR             Social History:     Social History     Tobacco Use     Smoking status: Never Smoker     Smokeless tobacco: Former User     Types: Chew   Substance Use Topics     Alcohol use: Yes     Comment: 14 cans of beer per week             Family History:   Family history was fully reviewed and non-contributory in this case.         Allergies:   No Known Allergies          Medications:     Prior to Admission medications    Medication Sig Last Dose Taking? Auth Provider   lisinopril-hydrochlorothiazide (PRINZIDE,ZESTORETIC) 20-25 MG per tablet Take 1 tablet by mouth daily 6/7/2019 at AM Yes Reported, Patient   OMEPRAZOLE PO Take 20 mg by mouth every morning 6/7/2019 at AM Yes Reported, Patient   cetirizine (ZYRTEC) 10 MG tablet Take 10 mg by mouth daily as needed for allergies More than a month at Unknown time  Unknown, Entered By History   fluticasone (FLONASE) 50 MCG/ACT nasal spray Spray 2 sprays into both nostrils daily as needed for rhinitis or allergies More than a month at Unknown time  Unknown, Entered By History             Review of Systems:   A Comprehensive greater than 10 system review of systems was carried out.  Pertinent positives and negatives are noted above.  Otherwise negative for contributory information.           Physical Exam:   Blood pressure 152/78, pulse 69, temperature 99.4  F (37.4  C), temperature source Oral, resp. rate 16, height 1.803 m (5' 11\"), weight 108.9 kg (240 lb), SpO2 98 %.  Wt Readings from Last 1 Encounters:   06/07/19 108.9 kg (240 lb)     Exam:  GENERAL: No apparent distress. Awake, alert, and fully oriented.  HEENT: Normocephalic, atraumatic. Extraocular movements intact.  CARDIOVASCULAR: Regular rate and rhythm without murmurs or rubs. No " JVD  PULMONARY: Clear to auscultation, no wheezes, crackles  ABDOMINAL: Soft, non-tender, non-distended. Bowel sounds normoactive. No hepatosplenomegaly.  EXTREMITIES: No cyanosis or clubbing. No edema.  NEUROLOGICAL: CN 2-12 grossly intact, awake and alert x3, spontaneous and coherent speech. no focal neurological deficits.  DERMATOLOGICAL: No rash, ulcer, ecchymoses, jaundice.  Psych: not agitation, not combative, pleasant mood  Lymph nodes: no obvious palpable  cervical or axillary lymphadenopathy         Data:   EKG: Reviewed, normal sinus rhythm rate of 80 bpm, nonspecific ST changes    Imaging:  Results for orders placed or performed during the hospital encounter of 06/07/19   Chest CT w IV contrast only, TRAUMA / DISSECTION    Narrative    CT CHEST WITH CONTRAST  6/7/2019 1:23 PM    HISTORY:  Maximal onset chest pain, now a bit better. Evaluate for  aortic tear/dissection, secondarily for PE if possible.    TECHNIQUE: Axial images from thoracic inlet to diaphragm. 80 mL  Isovue-370 IV contrast.  Radiation dose for this scan was reduced  using automated exposure control, adjustment of the mA and/or kV  according to patient size, or iterative reconstruction technique.    COMPARISON: 9/14/2018 chest x-ray.    FINDINGS:  No evidence for thoracic aortic dissection or aneurysm.  This exam is not specifically timed for pulmonary artery opacification  as assessment of the aorta was desired, but there is no evidence for  large or central pulmonary embolus. There is suboptimal opacification  of smaller pulmonary arteries.     No mediastinal or hilar adenopathy. Small calcified left hilar nodes  consistent with granulomatous disease as well as scattered splenic  calcifications. Postop changes lower cervical spine.    Indeterminate lateral left lower lobe nodule, series 10 image 203, 0.4  cm in size. No pleural effusion or infiltrate. There is a 0.7 cm  groundglass nodular opacity posterior right lower lobe, series  10  image 193, relatively elongated and 0.3 cm craniocaudal dimension on  coronal imaging.    Small right renal cyst incompletely included on this exam. Small  nonspecific 1.1 cm left adrenal nodule. Suspect fatty liver.      Impression    IMPRESSION:   1. No evidence for aortic dissection.  2. No evidence for large or central pulmonary embolus; suboptimal  enhancement or sensitivity in evaluating for smaller pulmonary emboli.  3. No pleural effusion or acute pulmonary disease. Indeterminate 0.4  cm left lower lobe nodule and subcentimeter focal groundglass  posterior right lower lobe opacity. 12 month followup CT is  recommended if the patient has known risk factors for malignancy such  as smoking or history of malignancy. In the absence of risk factors,  no followup for this tiny nodule is routinely recommended.     OCTAVIO PAPPAS MD       Labs:  No results for input(s): CULT in the last 168 hours.  Recent Labs   Lab 06/07/19  1244   WBC 8.1   HGB 14.9   HCT 43.3   MCV 90        Recent Labs   Lab 06/07/19  1244   *   POTASSIUM 3.3*   CHLORIDE 96   CO2 26   ANIONGAP 8   *   BUN 16   CR 1.12   GFRESTIMATED 75   GFRESTBLACK 87   BELINDA 9.0     No results for input(s): SED, CRP in the last 168 hours.  Recent Labs   Lab 06/07/19  1244   *     Recent Labs   Lab 06/07/19  1650 06/07/19  1247   TROPONIN  --  0.01   TROPI <0.015  --      No results for input(s): COLOR, APPEARANCE, URINEGLC, URINEBILI, URINEKETONE, SG, UBLD, URINEPH, PROTEIN, UROBILINOGEN, NITRITE, LEUKEST, RBCU, WBCU in the last 168 hours.

## 2019-06-08 NOTE — DISCHARGE SUMMARY
[facility]  Discharge Summary  Name: Isacc Cool    MRN: [id number]  YOB: 1965    Age: 54 year old  Date of Discharge:  6/8/2019  Date of Admission: 6/7/2019  Primary Care Provider: Wm Doty  Discharge Physician:  Mike Prescott MD  Discharging Service:  Hospitalist      Hospital Course/Discharge Diagnoses:    Isacc Cool is a very pleasant 54-year-old male with known history of hypertension, daily alcohol use of approximately 4 drinks per day, acid reflux who presented with onset of chest pain and pressure while in the office.  He characterized this is more intense reflux type pain with a pressure-like character, worse than it has ever been.  He did take Tums without much relief and was brought to the ER.  He was given nitroglycerin which provided some relief.  He underwent CT pulmonary angiogram which was negative for PE or other acute process.  EKG showed normal sinus rhythm with no acute ischemic changes noted.  He was started on subcu therapeutic dose Lovenox due to concern for unstable angina and then admitted for further care.  Serial troponins have since been negative and pain remains resolved.  Echocardiogram obtained today shows normal EF without any notable valvular issues are wall motion he was seen by cardiology who recommended outpatient coronary CT scan and improved discharge home today.    After some discussion we have elected to keep him on his usual blood pressure medication regimen including HCTZ and lisinopril and have advised that he follow-up with his primary care doctor to recheck his sodium and discuss any potential changes to his regimen.    He expresses good understanding and denies any current concerns.  He is eager to discharge home.  I have advised that until he completes his cardiac work-up he at least take a daily over-the-counter baby aspirin.    1.  Chest pain: Rule out ACS, CT pulmonary angiogram negative for acute process.  GI etiology  "seems most likely but certainly could consider unstable angina.  Cardiology recommending ongoing outpatient work-up involving a CT coronary angiogram.  Chest pain completely resolved.  I have started a baby aspirin.    2.  History of acid reflux    3.  Incidental finding of pulmonary nodule: The patient is aware of these and plans to follow-up with his primary care doctor for ongoing surveillance.    Discharge Disposition:  Discharged to home     Allergies:  No Known Allergies     Discharge Medications:        Review of your medicines      START taking      Dose / Directions   aspirin 81 MG EC tablet  Commonly known as:  ASA      Dose:  81 mg  Take 1 tablet (81 mg) by mouth daily  Refills:  0        CONTINUE these medicines which have NOT CHANGED      Dose / Directions   cetirizine 10 MG tablet  Commonly known as:  zyrTEC      Dose:  10 mg  Take 10 mg by mouth daily as needed for allergies  Refills:  0     fluticasone 50 MCG/ACT nasal spray  Commonly known as:  FLONASE      Dose:  2 spray  Spray 2 sprays into both nostrils daily as needed for rhinitis or allergies  Refills:  0     lisinopril-hydrochlorothiazide 20-25 MG tablet  Commonly known as:  PRINZIDE/ZESTORETIC      Dose:  1 tablet  Take 1 tablet by mouth daily  Refills:  0     OMEPRAZOLE PO      Dose:  20 mg  Take 20 mg by mouth every morning  Refills:  0           Where to get your medicines      Some of these will need a paper prescription and others can be bought over the counter. Ask your nurse if you have questions.    You don't need a prescription for these medications    aspirin 81 MG EC tablet         Condition on Discharge:  Discharge condition: Stable       Code status on discharge: Full Code     History of Illness:  See detailed admission note for full details.    Physical Exam:  Vital signs:  Temp: 98.9  F (37.2  C) Temp src: Oral BP: 122/77 Pulse: 69 Heart Rate: 54 Resp: 18 SpO2: 98 % O2 Device: None (Room air)   Height: 180.3 cm (5' 11\") Weight: " "105.2 kg (231 lb 14.4 oz)  Estimated body mass index is 32.34 kg/m  as calculated from the following:    Height as of this encounter: 1.803 m (5' 11\").    Weight as of this encounter: 105.2 kg (231 lb 14.4 oz).    Wt Readings from Last 1 Encounters:   06/08/19 105.2 kg (231 lb 14.4 oz)     General: Alert, awake, no acute distress.  HEENT: NC/AT, eyes anicteric, external occular movements intact, face symmetric.  Dentition WNL, MM moist.  Cardiac: RRR, S1, S2.  No murmurs appreciated.  Pulmonary: Normal chest rise, normal work of breathing.  Lungs CTA BL  Abdomen: soft, non-tender, non-distended.  Bowel Sounds Present.  No guarding.  Extremities: no deformities.  Warm, well perfused.  Skin: no rashes or lesions noted.  Warm and Dry.  Neuro: No focal deficits noted.  Speech clear.  Coordination and strength grossly normal.  Psych: Appropriate affect.    Procedures other than Imaging:  Echocardiogram w/ normal structure.     Imaging:  Results for orders placed or performed during the hospital encounter of 06/07/19   Chest CT w IV contrast only, TRAUMA / DISSECTION    Narrative    CT CHEST WITH CONTRAST  6/7/2019 1:23 PM    HISTORY:  Maximal onset chest pain, now a bit better. Evaluate for  aortic tear/dissection, secondarily for PE if possible.    TECHNIQUE: Axial images from thoracic inlet to diaphragm. 80 mL  Isovue-370 IV contrast.  Radiation dose for this scan was reduced  using automated exposure control, adjustment of the mA and/or kV  according to patient size, or iterative reconstruction technique.    COMPARISON: 9/14/2018 chest x-ray.    FINDINGS:  No evidence for thoracic aortic dissection or aneurysm.  This exam is not specifically timed for pulmonary artery opacification  as assessment of the aorta was desired, but there is no evidence for  large or central pulmonary embolus. There is suboptimal opacification  of smaller pulmonary arteries.     No mediastinal or hilar adenopathy. Small calcified left hilar " nodes  consistent with granulomatous disease as well as scattered splenic  calcifications. Postop changes lower cervical spine.    Indeterminate lateral left lower lobe nodule, series 10 image 203, 0.4  cm in size. No pleural effusion or infiltrate. There is a 0.7 cm  groundglass nodular opacity posterior right lower lobe, series 10  image 193, relatively elongated and 0.3 cm craniocaudal dimension on  coronal imaging.    Small right renal cyst incompletely included on this exam. Small  nonspecific 1.1 cm left adrenal nodule. Suspect fatty liver.      Impression    IMPRESSION:   1. No evidence for aortic dissection.  2. No evidence for large or central pulmonary embolus; suboptimal  enhancement or sensitivity in evaluating for smaller pulmonary emboli.  3. No pleural effusion or acute pulmonary disease. Indeterminate 0.4  cm left lower lobe nodule and subcentimeter focal groundglass  posterior right lower lobe opacity. 12 month followup CT is  recommended if the patient has known risk factors for malignancy such  as smoking or history of malignancy. In the absence of risk factors,  no followup for this tiny nodule is routinely recommended.     OCTAVIO PAPPAS MD        Consultations:  Consultation during this admission received from cardiology.       Recent Lab Results:  Recent Labs   Lab 06/07/19  1244   WBC 8.1   HGB 14.9   HCT 43.3   MCV 90             Lab Results   Component Value Date     06/08/2019     06/07/2019     09/14/2018    Lab Results   Component Value Date    CHLORIDE 103 06/08/2019    CHLORIDE 96 06/07/2019    CHLORIDE 91 09/14/2018    Lab Results   Component Value Date    BUN 14 06/08/2019    BUN 16 06/07/2019    BUN 17 09/14/2018      Lab Results   Component Value Date    POTASSIUM 4.5 06/08/2019    POTASSIUM 3.3 06/07/2019    POTASSIUM 3.1 06/07/2019    Lab Results   Component Value Date    CO2 28 06/08/2019    CO2 26 06/07/2019    CO2 29 09/14/2018    Lab Results    Component Value Date    CR 1.02 06/08/2019    CR 1.12 06/07/2019    CR 1.32 09/14/2018        Recent Labs   Lab 06/07/19  2237 06/07/19  1650 06/07/19  1247   TROPONIN  --   --  0.01   TROPI <0.015 <0.015  --           Pending Results:    Unresulted Labs Ordered in the Past 30 Days of this Admission     No orders found for last 61 day(s).           Discharge Instructions and Follow-Up:   Discharge Procedure Orders   Reason for your hospital stay   Order Comments: You were admitted for chest pain.     Follow-up and recommended labs and tests    Order Comments: Follow up for an outpatient CT coronary angiogram as advised by Cardiology.    Follow up with your PCP in one week for hospital follow up.  Please recheck your sodium.     Activity   Order Comments: Your activity upon discharge: gradually resume light to moderate activity as tolerated.  No heavy exertion until you are done with your cardiac workup.     Order Specific Question Answer Comments   Is discharge order? Yes      Diet   Order Comments: Follow this diet upon discharge: Orders Placed This Encounter      Regular Diet Adult     Order Specific Question Answer Comments   Is discharge order? Yes        Total time spent in face to face contact with the patient and coordinating discharge was:  30 Minutes.

## 2019-06-08 NOTE — PLAN OF CARE
VSS Denies any chest pain or pressure/pain or SOB. NPO for cards consult today. Echo planned for today. Tele SR K replacement per protocol level 3.3 Recheck is this AM. Will continue to monitor.

## 2019-06-10 DIAGNOSIS — R07.9 CHEST PAIN: Primary | ICD-10-CM

## 2019-06-11 ENCOUNTER — HOSPITAL ENCOUNTER (OUTPATIENT)
Dept: CARDIOLOGY | Facility: CLINIC | Age: 54
Discharge: HOME | End: 2019-06-11
Attending: INTERNAL MEDICINE | Admitting: INTERNAL MEDICINE
Payer: COMMERCIAL

## 2019-06-11 VITALS — DIASTOLIC BLOOD PRESSURE: 75 MMHG | SYSTOLIC BLOOD PRESSURE: 110 MMHG | HEART RATE: 56 BPM

## 2019-06-11 DIAGNOSIS — R07.9 CHEST PAIN: ICD-10-CM

## 2019-06-11 PROCEDURE — 25000132 ZZH RX MED GY IP 250 OP 250 PS 637: Performed by: INTERNAL MEDICINE

## 2019-06-11 PROCEDURE — 75574 CT ANGIO HRT W/3D IMAGE: CPT

## 2019-06-11 PROCEDURE — 75574 CT ANGIO HRT W/3D IMAGE: CPT | Mod: 26 | Performed by: INTERNAL MEDICINE

## 2019-06-11 PROCEDURE — 25000128 H RX IP 250 OP 636: Performed by: INTERNAL MEDICINE

## 2019-06-11 RX ORDER — ONDANSETRON 2 MG/ML
4 INJECTION INTRAMUSCULAR; INTRAVENOUS
Status: DISCONTINUED | OUTPATIENT
Start: 2019-06-11 | End: 2019-06-12 | Stop reason: HOSPADM

## 2019-06-11 RX ORDER — DIPHENHYDRAMINE HYDROCHLORIDE 50 MG/ML
25-50 INJECTION INTRAMUSCULAR; INTRAVENOUS
Status: DISCONTINUED | OUTPATIENT
Start: 2019-06-11 | End: 2019-06-12 | Stop reason: HOSPADM

## 2019-06-11 RX ORDER — IOPAMIDOL 755 MG/ML
500 INJECTION, SOLUTION INTRAVASCULAR ONCE
Status: COMPLETED | OUTPATIENT
Start: 2019-06-11 | End: 2019-06-11

## 2019-06-11 RX ORDER — NITROGLYCERIN 0.4 MG/1
0.4 TABLET SUBLINGUAL
Status: DISCONTINUED | OUTPATIENT
Start: 2019-06-11 | End: 2019-06-12 | Stop reason: HOSPADM

## 2019-06-11 RX ORDER — ACYCLOVIR 200 MG/1
0-1 CAPSULE ORAL
Status: DISCONTINUED | OUTPATIENT
Start: 2019-06-11 | End: 2019-06-12 | Stop reason: HOSPADM

## 2019-06-11 RX ORDER — METOPROLOL TARTRATE 1 MG/ML
5-15 INJECTION, SOLUTION INTRAVENOUS
Status: DISCONTINUED | OUTPATIENT
Start: 2019-06-11 | End: 2019-06-12 | Stop reason: HOSPADM

## 2019-06-11 RX ORDER — METHYLPREDNISOLONE SODIUM SUCCINATE 125 MG/2ML
125 INJECTION, POWDER, LYOPHILIZED, FOR SOLUTION INTRAMUSCULAR; INTRAVENOUS
Status: DISCONTINUED | OUTPATIENT
Start: 2019-06-11 | End: 2019-06-12 | Stop reason: HOSPADM

## 2019-06-11 RX ORDER — METOPROLOL TARTRATE 50 MG
50-100 TABLET ORAL
Status: COMPLETED | OUTPATIENT
Start: 2019-06-11 | End: 2019-06-11

## 2019-06-11 RX ORDER — DIPHENHYDRAMINE HCL 25 MG
25 CAPSULE ORAL
Status: DISCONTINUED | OUTPATIENT
Start: 2019-06-11 | End: 2019-06-12 | Stop reason: HOSPADM

## 2019-06-11 RX ADMIN — METOPROLOL TARTRATE 100 MG: 50 TABLET ORAL at 09:24

## 2019-06-11 RX ADMIN — IOPAMIDOL 120 ML: 755 INJECTION, SOLUTION INTRAVENOUS at 10:41

## 2019-06-11 RX ADMIN — NITROGLYCERIN 0.4 MG: 0.4 TABLET SUBLINGUAL at 10:28

## 2019-06-11 RX ADMIN — SODIUM CHLORIDE 100 ML: 9 INJECTION, SOLUTION INTRAVENOUS at 10:41

## 2019-06-12 ENCOUNTER — TELEPHONE (OUTPATIENT)
Dept: CARDIOLOGY | Facility: CLINIC | Age: 54
End: 2019-06-12

## 2019-06-12 NOTE — TELEPHONE ENCOUNTER
Patient called to inquire about CT angio results. Discussed that results are WNL, Ca score is zero, no plausible cardiac cause for chest pain at this time. Also informed patient that a letter was sent further discussing results. Stated he may need cardiac clearance for upcoming knee surgery. Encouraged to contact clinic in the event that this is needed. Patient verbalized understanding and agreed to plan of care.     Leroa Correa RN Care Coordinator

## 2021-04-22 ENCOUNTER — HOSPITAL ENCOUNTER (EMERGENCY)
Facility: CLINIC | Age: 56
Discharge: HOME | End: 2021-04-22
Attending: NURSE PRACTITIONER | Admitting: NURSE PRACTITIONER
Payer: COMMERCIAL

## 2021-04-22 VITALS
RESPIRATION RATE: 18 BRPM | OXYGEN SATURATION: 99 % | HEART RATE: 76 BPM | SYSTOLIC BLOOD PRESSURE: 148 MMHG | TEMPERATURE: 98.8 F | DIASTOLIC BLOOD PRESSURE: 90 MMHG

## 2021-04-22 DIAGNOSIS — R53.83 FATIGUE: ICD-10-CM

## 2021-04-22 PROCEDURE — 99282 EMERGENCY DEPT VISIT SF MDM: CPT

## 2021-04-22 ASSESSMENT — ENCOUNTER SYMPTOMS
ABDOMINAL PAIN: 0
CHILLS: 0
SHORTNESS OF BREATH: 1
DYSURIA: 0
FEVER: 0
FATIGUE: 1

## 2021-04-22 NOTE — ED TRIAGE NOTES
A&O x4.  ABC's intact.      Pt arrives with c/o fatigue slept 18 hours yesterday. Diagnosed covid 2 1/2 wks ago.

## 2021-04-22 NOTE — ED PROVIDER NOTES
History   Chief Complaint:  Fatigue     HPI   Isacc Cool is a 56 year old male with history of hypertension who presents with fatigue. The patient states he was diagnosed with COVID 12 days ago. Since then he states his symptoms have mostly resolved but notes some ongoing fatigue and mild shortness of breath. States he slept 18 hours yesterday. Reports he did not want to come in but his wife made him.     Review of Systems   Constitutional: Positive for fatigue. Negative for chills and fever.   Respiratory: Positive for shortness of breath.    Cardiovascular: Negative for chest pain.   Gastrointestinal: Negative for abdominal pain.   Genitourinary: Negative for dysuria.   All other systems reviewed and are negative.      Allergies:  The patient has no known allergies.     Medications:  Zestril    Past Medical History:    GERD  Hypertension  Meningitis  Cervical spinal stenosis  ACS   DJD  Hyperlipidemia   OA    Past Surgical History:    Arthroscopy knee  BHA implants  Decompression fusion cervical   Foraminotomy posterior one level    Vasectomy     Social History:  Patient presents alone.  Marital status:      Physical Exam     Patient Vitals for the past 24 hrs:   BP Temp Temp src Pulse Resp SpO2   04/22/21 1252 (!) 148/90 98.8  F (37.1  C) Temporal 76 18 99 %       Physical Exam  General: Alert, Mild  discomfort, well kept, obese  Eyes: PERRL, conjunctivae pink no scleral icterus or conjunctival injection  ENT:   Moist mucus membranes, posterior oropharynx clear without erythema or exudates, No lymphadenopathy, Normal voice  Resp:  Lungs clear to auscultation bilaterally, no crackles/rubs/wheezes. Good air movement  CV:  Normal rate and rhythm, no murmurs/rubs/gallops  GI:  Abdomen soft and non-distended.  Normoactive BS.  No tenderness, guarding or rebound, No masses  Skin:  Warm, dry.  No rashes or petechiae  Musculoskeletal: No peripheral edema or calf tenderness, Normal gross ROM   Neuro: Alert  "and oriented to person/place/time, normal sensation  Psychiatric: Normal affect, cooperative, good eye contact    Emergency Department Course     Emergency Department Course:    Reviewed:  I reviewed the patient's nursing notes, vitals, past medical records, Care Everywhere.     Assessments:  1417    I evaluated the patient and performed an exam as above. He declines labs and is comfortable with discharge.     Disposition:  The patient was discharged to home.     Impression & Plan     CMS Diagnoses: None    Medical Decision Making:  Isacc Cool is a 56 year old male who presents today for evaluation of fatigue.  He was diagnosed with Covid proximately 2-1/2 weeks ago and has felt fatigued since that time over the last couple days he has felt more fatigued and as a result his \"wife made him come.\"  He describes symptoms that are consistent with deconditioning as a result of COVID-19 illness.  He does not describe anything that is concerning for ACS or PE.  Again his symptoms have not significantly changed.  He has had no nausea or vomiting.  No chest pain.  He states that he is so tired \"it is work to breathe.\"  He denies any difficulty breathing or increased work of breathing.  He is vitally stable with good O2 sats.  Again no increased work of breathing.  No signs of PE or DVT.  After discussing his symptoms and the duration of them together we decided against any further evaluation.  He is comfortable with plan.  Return protocols and follow-up were discussed.  He is discharged home.      Covid-19  Isacc Cool was evaluated during a global COVID-19 pandemic, which necessitated consideration that the patient might be at risk for infection with the SARS-CoV-2 virus that causes COVID-19.   Applicable protocols for evaluation were followed during the patient's care.   COVID-19 was considered as part of the patient's evaluation. The plan for testing is:  a test was obtained at a previous visit and reviewed & " considered today.    Diagnosis:    ICD-10-CM    1. Fatigue  R53.83        Scribe Disclosure:  I, Sharron Maravilla, am serving as a scribe at 1:50 PM on 4/22/2021 to document services personally performed by Isaias Brewster APRN  based on my observations and the provider's statements to me.      Isaias Brewster APRN CNP  04/22/21 1859

## 2021-04-22 NOTE — DISCHARGE INSTRUCTIONS
Continue to eat healthy diet and drink plenty of fluid.  You may use Tylenol and/or ibuprofen as needed for discomfort.

## 2021-04-24 ENCOUNTER — HOSPITAL ENCOUNTER (EMERGENCY)
Facility: CLINIC | Age: 56
Discharge: HOME | End: 2021-04-24
Attending: EMERGENCY MEDICINE | Admitting: EMERGENCY MEDICINE
Payer: COMMERCIAL

## 2021-04-24 ENCOUNTER — APPOINTMENT (OUTPATIENT)
Dept: CT IMAGING | Facility: CLINIC | Age: 56
End: 2021-04-24
Attending: EMERGENCY MEDICINE
Payer: COMMERCIAL

## 2021-04-24 VITALS
SYSTOLIC BLOOD PRESSURE: 147 MMHG | OXYGEN SATURATION: 93 % | HEART RATE: 75 BPM | DIASTOLIC BLOOD PRESSURE: 78 MMHG | TEMPERATURE: 97.6 F | RESPIRATION RATE: 17 BRPM

## 2021-04-24 DIAGNOSIS — R91.1 NODULE OF RIGHT LUNG: ICD-10-CM

## 2021-04-24 DIAGNOSIS — R07.9 CHEST PAIN, UNSPECIFIED TYPE: ICD-10-CM

## 2021-04-24 DIAGNOSIS — N28.9 LESION OF RIGHT NATIVE KIDNEY: ICD-10-CM

## 2021-04-24 LAB
ALBUMIN SERPL-MCNC: 4.1 G/DL (ref 3.4–5)
ALP SERPL-CCNC: 66 U/L (ref 40–150)
ALT SERPL W P-5'-P-CCNC: 104 U/L (ref 0–70)
ANION GAP SERPL CALCULATED.3IONS-SCNC: 6 MMOL/L (ref 3–14)
AST SERPL W P-5'-P-CCNC: 110 U/L (ref 0–45)
BASOPHILS # BLD AUTO: 0 10E9/L (ref 0–0.2)
BASOPHILS NFR BLD AUTO: 0 %
BILIRUB SERPL-MCNC: 0.7 MG/DL (ref 0.2–1.3)
BUN SERPL-MCNC: 14 MG/DL (ref 7–30)
CALCIUM SERPL-MCNC: 9.5 MG/DL (ref 8.5–10.1)
CHLORIDE SERPL-SCNC: 103 MMOL/L (ref 94–109)
CO2 SERPL-SCNC: 27 MMOL/L (ref 20–32)
CREAT SERPL-MCNC: 1.03 MG/DL (ref 0.66–1.25)
D DIMER PPP FEU-MCNC: 0.5 UG/ML FEU (ref 0–0.5)
DIFFERENTIAL METHOD BLD: ABNORMAL
EOSINOPHIL # BLD AUTO: 0.1 10E9/L (ref 0–0.7)
EOSINOPHIL NFR BLD AUTO: 1 %
ERYTHROCYTE [DISTWIDTH] IN BLOOD BY AUTOMATED COUNT: 13 % (ref 10–15)
ETHANOL SERPL-MCNC: 0.05 G/DL
GFR SERPL CREATININE-BSD FRML MDRD: 82 ML/MIN/{1.73_M2}
GLUCOSE SERPL-MCNC: 152 MG/DL (ref 70–99)
HCT VFR BLD AUTO: 46.9 % (ref 40–53)
HGB BLD-MCNC: 16 G/DL (ref 13.3–17.7)
LIPASE SERPL-CCNC: 95 U/L (ref 73–393)
LYMPHOCYTES # BLD AUTO: 6.9 10E9/L (ref 0.8–5.3)
LYMPHOCYTES NFR BLD AUTO: 60 %
MCH RBC QN AUTO: 30.8 PG (ref 26.5–33)
MCHC RBC AUTO-ENTMCNC: 34.1 G/DL (ref 31.5–36.5)
MCV RBC AUTO: 90 FL (ref 78–100)
MONOCYTES # BLD AUTO: 0.6 10E9/L (ref 0–1.3)
MONOCYTES NFR BLD AUTO: 5 %
NEUTROPHILS # BLD AUTO: 3.9 10E9/L (ref 1.6–8.3)
NEUTROPHILS NFR BLD AUTO: 34 %
NT-PROBNP SERPL-MCNC: 40 PG/ML (ref 0–900)
PLATELET # BLD AUTO: 214 10E9/L (ref 150–450)
PLATELET # BLD EST: ABNORMAL 10*3/UL
POTASSIUM SERPL-SCNC: 3.8 MMOL/L (ref 3.4–5.3)
PROT SERPL-MCNC: 8.3 G/DL (ref 6.8–8.8)
RBC # BLD AUTO: 5.2 10E12/L (ref 4.4–5.9)
RBC MORPH BLD: ABNORMAL
SODIUM SERPL-SCNC: 136 MMOL/L (ref 133–144)
TROPONIN I SERPL-MCNC: <0.015 UG/L (ref 0–0.04)
WBC # BLD AUTO: 11.5 10E9/L (ref 4–11)

## 2021-04-24 PROCEDURE — 82077 ASSAY SPEC XCP UR&BREATH IA: CPT | Performed by: EMERGENCY MEDICINE

## 2021-04-24 PROCEDURE — 80053 COMPREHEN METABOLIC PANEL: CPT | Performed by: EMERGENCY MEDICINE

## 2021-04-24 PROCEDURE — 96374 THER/PROPH/DIAG INJ IV PUSH: CPT | Mod: 59

## 2021-04-24 PROCEDURE — 96375 TX/PRO/DX INJ NEW DRUG ADDON: CPT

## 2021-04-24 PROCEDURE — 83690 ASSAY OF LIPASE: CPT | Performed by: EMERGENCY MEDICINE

## 2021-04-24 PROCEDURE — 250N000009 HC RX 250: Performed by: EMERGENCY MEDICINE

## 2021-04-24 PROCEDURE — 250N000011 HC RX IP 250 OP 636: Performed by: EMERGENCY MEDICINE

## 2021-04-24 PROCEDURE — 85025 COMPLETE CBC W/AUTO DIFF WBC: CPT | Performed by: EMERGENCY MEDICINE

## 2021-04-24 PROCEDURE — 93005 ELECTROCARDIOGRAM TRACING: CPT

## 2021-04-24 PROCEDURE — 83880 ASSAY OF NATRIURETIC PEPTIDE: CPT | Performed by: EMERGENCY MEDICINE

## 2021-04-24 PROCEDURE — 99285 EMERGENCY DEPT VISIT HI MDM: CPT | Mod: 25

## 2021-04-24 PROCEDURE — 74177 CT ABD & PELVIS W/CONTRAST: CPT

## 2021-04-24 PROCEDURE — 84484 ASSAY OF TROPONIN QUANT: CPT | Performed by: EMERGENCY MEDICINE

## 2021-04-24 PROCEDURE — 93005 ELECTROCARDIOGRAM TRACING: CPT | Mod: 76

## 2021-04-24 PROCEDURE — 85379 FIBRIN DEGRADATION QUANT: CPT | Performed by: EMERGENCY MEDICINE

## 2021-04-24 RX ORDER — CHLORDIAZEPOXIDE HYDROCHLORIDE 25 MG/1
25 CAPSULE, GELATIN COATED ORAL 3 TIMES DAILY PRN
Qty: 5 CAPSULE | Refills: 0 | Status: SHIPPED | OUTPATIENT
Start: 2021-04-24

## 2021-04-24 RX ORDER — LORAZEPAM 2 MG/ML
1 INJECTION INTRAMUSCULAR ONCE
Status: COMPLETED | OUTPATIENT
Start: 2021-04-24 | End: 2021-04-24

## 2021-04-24 RX ORDER — ONDANSETRON 2 MG/ML
8 INJECTION INTRAMUSCULAR; INTRAVENOUS ONCE
Status: COMPLETED | OUTPATIENT
Start: 2021-04-24 | End: 2021-04-24

## 2021-04-24 RX ORDER — IOPAMIDOL 755 MG/ML
500 INJECTION, SOLUTION INTRAVASCULAR ONCE
Status: COMPLETED | OUTPATIENT
Start: 2021-04-24 | End: 2021-04-24

## 2021-04-24 RX ADMIN — IOPAMIDOL 80 ML: 755 INJECTION, SOLUTION INTRAVENOUS at 06:15

## 2021-04-24 RX ADMIN — FAMOTIDINE 20 MG: 10 INJECTION, SOLUTION INTRAVENOUS at 06:00

## 2021-04-24 RX ADMIN — LORAZEPAM 1 MG: 2 INJECTION INTRAMUSCULAR; INTRAVENOUS at 05:57

## 2021-04-24 RX ADMIN — ONDANSETRON 8 MG: 2 INJECTION INTRAMUSCULAR; INTRAVENOUS at 06:04

## 2021-04-24 RX ADMIN — LORAZEPAM 1 MG: 2 INJECTION INTRAMUSCULAR; INTRAVENOUS at 07:41

## 2021-04-24 RX ADMIN — SODIUM CHLORIDE 60 ML: 9 INJECTION, SOLUTION INTRAVENOUS at 06:15

## 2021-04-24 ASSESSMENT — ENCOUNTER SYMPTOMS
SHORTNESS OF BREATH: 1
FEVER: 0
NAUSEA: 1
COUGH: 0

## 2021-04-24 NOTE — DISCHARGE INSTRUCTIONS
You were noted to have a lesion on your right kidney in your right lung.  This will need to be followed up by your primary care doctor.    Please discontinue alcohol use as I believe this is contributing to your symptoms today.    Discharge Instructions  Chest Pain    You have been seen today for chest pain or discomfort.  At this time, your provider has found no signs that your chest pain is due to a serious or life-threatening condition, (or you have declined more testing and/or admission to the hospital). However, sometimes there is a serious problem that does not show up right away. Your evaluation today may not be complete and you may need further testing and evaluation.     Generally, every Emergency Department visit should have a follow-up clinic visit with either a primary or a specialty clinic/provider. Please follow-up as instructed by your emergency provider today.  Return to the Emergency Department if:  Your chest pain changes, gets worse, starts to happen more often, or comes with less activity.  You are newly short of breath.  You get very weak or tired.  You pass out or faint.  You have any new symptoms, like fever, cough, numb legs, or you cough up blood.  You have anything else that worries you.    Until you follow-up with your regular provider, please do the following:  Take one aspirin daily unless you have an allergy or are told not to by your provider.  If a stress test appointment has been made, go to the appointment.  If you have questions, contact your regular provider.  Follow-up with your regular provider/clinic as directed; this is very important.    If you were given a prescription for medicine here today, be sure to read all of the information (including the package insert) that comes with your prescription.  This will include important information about the medicine, its side effects, and any warnings that you need to know about.  The pharmacist who fills the prescription can provide  more information and answer questions you may have about the medicine.  If you have questions or concerns that the pharmacist cannot address, please call or return to the Emergency Department.       Remember that you can always come back to the Emergency Department if you are not able to see your regular provider in the amount of time listed above, if you get any new symptoms, or if there is anything that worries you.

## 2021-04-24 NOTE — ED TRIAGE NOTES
Positive COVID 3 weeks ago.  Pt has been asymptomatic for several days.  Chest pain and SOB since last evening.

## 2021-04-24 NOTE — ED PROVIDER NOTES
History   Chief Complaint:  Shortness of Breath and Chest Pain       HPI     Isacc Cool is a 56 year old male with history of HTN who presents with chest pain and shortness of breath.  Last night at 10 PM the patient had the gradual onset of substernal chest discomfort described as a pressure sensation.  This occurred at rest.  Since then the pain has been relatively constant with no obvious alleviating or aggravating factors.  He has been short of breath and nauseated in the absence of vomiting.  He did have a CTA chest which revealed no abnormal coronary artery findings in 2019.  He has no history of DVT, PE, unilateral leg swelling, recent immobilization, hemoptysis, malignancy.  He did test positive for COVID 3 weeks ago and reports those symptoms have largely resolved.  He admits to 1-3 alcoholic drinks per day.    Review of Systems   Constitutional: Negative for fever.   Respiratory: Positive for shortness of breath. Negative for cough.    Cardiovascular: Positive for chest pain.   Gastrointestinal: Positive for nausea.   Skin: Negative for rash.   All other systems reviewed and are negative.    Allergies:  The patient has no known allergies.     Medications:  Hydrochlorothiazide  Zestril  Flexeril  Prilosec    Past Medical History:    Gastroesophageal reflux disease  Hypertension  Chronic pain  Angina  Acute coronary syndrome  Meningitis  Cervical spinal stenosis  Osteoarthritis  Carpal tunnel syndrome bilateral  Radiculopathy cervical eighth    Past Surgical History:    Right knee arthroscopy  BHA implants  Decompression, fusion cervical anterior three+ levels  Foraminotomy cervical posterior one level    Family History:    Coronary artery disease      Physical Exam     Patient Vitals for the past 24 hrs:   BP Temp Temp src Pulse Resp SpO2   04/24/21 0830 -- -- -- 75 -- --   04/24/21 0815 (!) 147/78 -- -- 67 -- --   04/24/21 0800 (!) 145/74 -- -- 64 -- --   04/24/21 0745 (!) 151/75 -- -- 65 17 93 %    04/24/21 0730 (!) 155/79 -- -- 82 15 96 %   04/24/21 0715 (!) 149/65 -- -- 75 15 93 %   04/24/21 0700 137/76 -- -- 67 14 93 %   04/24/21 0645 (!) 152/75 -- -- 63 17 94 %   04/24/21 0630 (!) 158/76 -- -- 58 12 99 %   04/24/21 0625 (!) 161/79 -- -- 63 -- --   04/24/21 0615 109/76 -- -- 67 23 99 %   04/24/21 0600 97/61 -- -- 60 29 99 %   04/24/21 0545 105/52 -- -- 60 26 98 %   04/24/21 0540 -- -- -- 80 18 98 %   04/24/21 0535 (!) 177/91 -- -- 76 -- 97 %   04/24/21 0529 (!) 202/97 97.6  F (36.4  C) Temporal 97 24 97 %       Physical Exam  Eyes:    Conjunctiva normal  Neck:    Supple, no meningismus.     CV:     Regular rate and rhythm.      No murmurs, rubs or gallops.       No unilateral leg swelling.       2+ radial pulses bilateral.       No lower extremity edema.  PULM:    Clear to auscultation bilateral.       No respiratory distress.      Good air exchange.     No rales or wheezing.     No stridor.  ABD:    Soft, non-tender, non-distended.       No pulsatile masses.       No rebound, guarding or rigidity.  MSK:     No gross deformity to all four extremities.   LYMPH:   No cervical lymphadenopathy.  NEURO:   Alert. Good muscle tone, no atrophy.  Skin:    Diaphoretic   Psych:    Anxious        Emergency Department Course   ECG #1 (05:38:18):  Rate 76 bpm. AZ interval 152. QRS duration 96. QT/QTc 396/445. P-R-T axes 38 21 27. Normal sinus rhythm. Normal EKG. Interpreted at 0601 by Jemal Velázquez MD.    ECG #2 (06:11:04):  Rate 68 bpm. AZ interval 156. QRS duration 90. QT/QTc 434/461. P-R-T axes 60 39 26 . Normal sinus rhythm. Normal EKG. Interpreted at 0618 by Jemal Velázquez MD.    Imaging:    CT-scan Aortic Survey w/ contrast:  1.  Negative for thoracoabdominal aortic aneurysm or dissection.     2.  Mild atherosclerotic change and mild coronary artery disease.     3.  Hepatic steatosis.     4.  3.6 cm low-density focus in the right renal lower pole, not a simple cyst by density measurement. Recommend  follow-up ultrasound on an outpatient basis.     5.  6 mm subsolid right upper lobe nodular opacity. Please see follow-up guidelines below.     6.  Left femoral head avascular necrosis. No femoral head height loss.   Result per radiology.      Laboratory:    CBC: WBC: 11.5 (H), HGB: 16.0, PLT: 214  CMP: Glucose 152 (H), ALT: 104 (H), AST: 110 (H), o/w WNL (Creatinine: 1.03)  Troponin (Collected 0540): <0.015  D-dimer: 0.5  Lipase: 95  BNP: 40  Alcohol level blood: 0.05 (H)    Emergency Department Course:    Reviewed:    I reviewed the patient's nursing notes, vitals, past medical records, Care Everywhere.     Assessments:    1800: I performed an exam of the patient and obtained history, as documented above.      0845: I rechecked the patient and updated them on findings. They are amenable to discharge.     Interventions:  0557: Ativan 1 mg IV  0600: Pepcid 20 mg IV  0604: Zofran 8 mg IV  0741: Ativan 1 mg IV    Disposition:  The patient was discharged to home.       Impression & Plan   Medical Decision Makin-year-old male presented to the ED with a greater than 8-hour history of chest pain, shortness of breath and nausea.  He was evaluated for ACS.  EKG without ischemic changes.  Repeat EKG adynamic.  Troponin within normal limits despite greater than 8 hours of constant pain thus ruling out MI.  Low suspicion for pulmonary embolism and D-dimer negative.  Patient was initially hypertensive then had a significant drop in his blood pressure to the mid 90s.  Along with this drop, he was diaphoretic and anxious.  Concern andra for potential aortic pathology.  He underwent CT scan of his chest abdomen pelvis aortic survey in which there is no acute findings including aortic pathology to account for his symptoms.    Given the reassuring CT scan, concern andra for possible alcohol withdrawal.  Patient admits to daily alcohol use although seems to minimize.  His alcohol level is 0.05 despite no alcohol in the last 8  hours per his report.  He was given lorazepam x2 with significant improvement of his symptoms.  I believe there is a significant component of alcohol withdrawal and some degree of anxiety.  He has no developing hypertension, tachycardia or resting tremor that would warrant further treatment including hospitalization for alcohol withdrawal.  He will be discharged home with alcohol cessation and limited supply of Librium.  Patient encouraged to follow-up primary care this in the next 2 to 3 days and return to the ER for any worsening symptoms.    Incidental notes of right renal lesion and right lung nodule.  Patient made aware of these findings and need to follow with PCP for surveillance.    Diagnosis:    ICD-10-CM    1. Chest pain, unspecified type  R07.9 CBC with platelets differential     Alcohol ethyl     Alcohol ethyl     CANCELED: Alcohol level blood   2. Lesion of right native kidney  N28.9    3. Nodule of right lung  R91.1        Scribe Disclosure:  Beverley LAUREN, am serving as a scribe at 5:53 AM on 4/24/2021 to document services personally performed by Jemal Velázquez MD based on my observations and the provider's statements to me.         Jemal Velázquez MD  04/24/21 1132

## 2021-04-26 LAB
INTERPRETATION ECG - MUSE: NORMAL
INTERPRETATION ECG - MUSE: NORMAL

## 2021-09-04 ENCOUNTER — HEALTH MAINTENANCE LETTER (OUTPATIENT)
Age: 56
End: 2021-09-04

## 2021-09-14 ENCOUNTER — DOCUMENTATION ONLY (OUTPATIENT)
Dept: LAB | Facility: CLINIC | Age: 56
End: 2021-09-14

## 2022-09-23 ENCOUNTER — E-VISIT (OUTPATIENT)
Dept: URGENT CARE | Facility: CLINIC | Age: 57
End: 2022-09-23
Payer: COMMERCIAL

## 2022-09-23 DIAGNOSIS — J01.90 ACUTE SINUSITIS, RECURRENCE NOT SPECIFIED, UNSPECIFIED LOCATION: Primary | ICD-10-CM

## 2022-09-23 PROCEDURE — 99421 OL DIG E/M SVC 5-10 MIN: CPT | Performed by: INTERNAL MEDICINE

## 2022-09-23 NOTE — PATIENT INSTRUCTIONS
When to Use Antibiotics    Antibiotics are medicines used to treat infections caused by bacteria. They don t work for an illness caused by a virus. And they don't work for an allergic reaction. In fact, taking antibiotics for reasons other than an infection by bacteria can cause problems. You may have side effects from the medicine. And if you need an antibiotic in the future, it may not work well. This is because the bacteria can become immune to the medicine. You can also get a type of diarrhea that's hard to treat. This diarrhea is called C. diff.   When antibiotics likely won t help  Your healthcare provider won t usually give you antibiotics for the conditions listed below. You can help by not asking for them if you have:     A cold. This type of illness is caused by a virus. It can cause a runny nose, stuffed-up nose, sneezing, coughing, and headache. You may also have mild body aches and low fever. A cold gets better on its own in a few days to a week.    The flu (influenza). This is a respiratory illness caused by a virus. The flu usually goes away on its own in a week or so. It can cause fever, body aches, sore throat, and tiredness.    Bronchitis. This is an infection in the lungs. It is most often caused by a virus. You may have coughing, phlegm, body aches, and a low fever. A common type of bronchitis is known as a chest cold. This is called acute bronchitis. This often happens after you have a respiratory infection like a cold. Bronchitis can take weeks to go away. Antibiotics often don t help.    Most sore throats. Sore throats are most often caused by viruses. Your throat may feel scratchy or achy. It may hurt to swallow. You may also have a low fever and body aches. A sore throat usually gets better in a few days.    Most outer ear infections. An ear infection may be caused by a virus or bacteria. It causes pain in the ear. Antibiotics by mouth usually don t help. Low-dose antibiotic ear drops  work much better.    Some inner ear infections. An inner ear infection (otitis media) can be caused by a virus in the ear. It can also cause pain and a high fever. Most older children with low-grade fever don't need to be treated with antibiotics.    Most sinus infections. This is also known as sinusitis. This kind of infection causes sinus pain and swelling, and a runny nose. In most cases, it goes away on its own. Antibiotics don t make recovery quicker.    Allergic rhinitis. This is a set of symptoms caused by an allergic reaction. You may have sneezing, a runny nose, itchy or watery eyes, or a sore throat. Allergies are not treated with antibiotics.    Low fever. A mild fever that s less than 100.4 F (38 C) most likely doesn t need to be treated with antibiotics.   When antibiotics can help  Antibiotics can be used to treat:                                                       Strep throat. This is a throat infection caused by a certain type of bacteria. Symptoms of strep throat include a sore throat, white patches on the tonsils, red spots on the roof of the mouth, fever, body aches, and nausea and vomiting. Strep throat almost never causes a cough.    Urinary tract infection (UTI). This is an infection of the bladder and the tube that takes urine out of the body. It is caused by bacteria. It can cause burning pain and urine that s cloudy or tinted with blood. UTIs are very common. Antibiotics usually help treat them.    Some outer ear infections. In some cases, a healthcare provider may prescribe antibiotics by mouth for an ear infection. You may need a test to show the cause of the ear infection.    Some sinus infections. In some cases, your healthcare provider may give you antibiotics. He or she may first need to make sure your symptoms aren t caused by something else. This may be a virus, fungus, allergies, or air pollutants such as smoke.   Your healthcare provider may give you antibiotics if you have a  condition that can affect your immune system. This includes diabetes or cancer.  Self-care at home  If your infection can t be treated with antibiotics, you can take other steps to feel better. Try the remedies below. In general:     Rest and sleep as much as needed.    Drink water and other clear fluids.    Don t smoke. Stay away from smoke from other people.    Use over-the-counter medicine such as acetaminophen or ibuprofen to ease pain or fever, as directed by your healthcare provider.  To treat sinus pain or nasal stuffiness:    Put a warm, moist cloth on your face where you feel sinus pain or pressure.    Try a nasal spray with medicine or saline. Use as directed by your healthcare provider.    Breathe in steam from a hot shower.    Use a humidifier or cool mist vaporizer.   To quiet a cough:     Use a humidifier or cool mist vaporizer.    Breathe in steam from a hot shower.    Suck on cough lozenges.   To sooth a sore throat:     Suck on ice chips, frozen ice pops, or lozenges.    Use a sore throat spray.    Use a humidifier or cool mist vaporizer.    Gargle with saltwater.    Drink warm liquids.    Take ibuprofen to reduce swelling and pain.  To ease ear pain:     Hold a warm, moist washcloth on the ear for 10 minutes at a time.  [employer] last reviewed this educational content on 12/1/2019 2000-2021 [employer]. All rights reserved. This information is not intended as a substitute for professional medical care. Always follow your healthcare professional's instructions.          Sinusitis     The sinuses are air-filled spaces within the bones of the face. They connect to the inside of the nose. Sinusitis is an inflammation of the tissue that lines the sinuses. Sinusitis can occur during a cold. It can also happen due to allergies to pollens and other particles in the air. Sinusitis can cause symptoms of sinus congestion and a feeling of fullness. A sinus infection causes fever, headache, and  facial pain. There is often green or yellow fluid draining from the nose or into the back of the throat (post-nasal drip). You have been given antibiotics to treat this condition.   Home care    Drink plenty of water, hot tea, and other liquids as directed by the healthcare provider. This may help thin nasal mucus. It also may help your sinuses drain fluids.    Heat may help soothe painful areas of your face. Use a towel soaked in hot water. Or,  the shower and direct the warm spray onto your face. Using a vaporizer along with a menthol rub at night may also help soothe symptoms.     An expectorant with guaifenesin may help thin nasal mucus and help your sinuses drain fluids. Talk with your provider or pharmacists before taking an over-the-counter (OTC) medicine if you have any questions about it or its side effects..    You can use an OTC decongestant, unless a similar medicine was prescribed to you. Nasal sprays work the fastest. Use one that contains phenylephrine or oxymetazoline. First blow your nose gently. Then use the spray. Don't use these medicines more often than directed on the label. If you do, your symptoms may get worse. You may also take pills that contain pseudoephedrine. Don t use products that combine multiple medicines. This is because side effects may be increased. Read labels. You can also ask the pharmacist for help. (People with high blood pressure should not use decongestants. They can raise blood pressure.) Talk with your provider or pharmacist if you have any questions about the medicine..    OTC antihistamines may help if allergies contributed to your sinusitis. Talk with your provider or pharmacist if you have any questions about the medicine..    Don't use nasal rinses or irrigation during an acute sinus infection, unless your healthcare provider tells you to. Rinsing may spread the infection to other areas in your sinuses.    Use acetaminophen or ibuprofen to control pain,  unless another pain medicine was prescribed to you. If you have chronic liver or kidney disease or ever had a stomach ulcer, talk with your healthcare provider before using these medicines. Never give aspirin to anyone under age 18 who is ill with a fever. It may cause severe liver damage.    Don't smoke. This can make symptoms worse.    Follow-up care  Follow up with your healthcare provider, or as advised.   When to seek medical advice  Call your healthcare provider if any of these occur:     Facial pain or headache that gets worse    Stiff neck    Unusual drowsiness or confusion    Swelling of your forehead or eyelids    Symptoms don't go away in 10 days    Vision problems, such as blurred or double vision    Fever of 101 or higher, or as directed by your healthcare provider  Call 911  Call 911 if any of these occur:     Seizure    Trouble breathing    Feeling dizzy or faint    Fingernails, skin or lips look blue, purple , or gray  Prevention  Here are steps you can take to help prevent an infection:     Keep good hand washing habits.    Don t have close contact with people who have sore throats, colds, or other upper respiratory infections.    Don t smoke, and stay away from secondhand smoke.    Stay up to date with of your vaccines.  [employer] last reviewed this educational content on 12/1/2019 2000-2021 [employer]. All rights reserved. This information is not intended as a substitute for professional medical care. Always follow your healthcare professional's instructions.        Dear Isacc Cool    After reviewing your responses, I've been able to diagnose you with?a sinus infection most likely caused by a virus.? Antibiotics are not indicated for this condition at this time.    It is also important to stay well hydrated, get lots of rest and take over-the-counter decongestants,?tylenol?or ibuprofen if you?are able to?take those medications per your primary care provider to help relieve  discomfort.?     It is important that you take?all of?your prescribed medication even if your symptoms are improving after a few doses.? Taking?all of?your medicine helps prevent the symptoms from returning.?     If your symptoms worsen, you develop severe headache, vomiting, high fever (>102), or are not improving in 7 days, please contact your primary care provider for an appointment or visit any of our convenient Walk-in Care or Urgent Care Centers to be seen which can be found on our website?here.?     Thanks again for choosing?us?as your health care partner,?   ?  Rosa Maria Ellis MD?

## 2022-10-16 ENCOUNTER — HEALTH MAINTENANCE LETTER (OUTPATIENT)
Age: 57
End: 2022-10-16

## 2023-11-04 ENCOUNTER — HEALTH MAINTENANCE LETTER (OUTPATIENT)
Age: 58
End: 2023-11-04

## 2024-12-22 ENCOUNTER — HEALTH MAINTENANCE LETTER (OUTPATIENT)
Age: 59
End: 2024-12-22

## (undated) RX ORDER — METOPROLOL TARTRATE 50 MG
TABLET ORAL
Status: DISPENSED
Start: 2019-06-11

## (undated) RX ORDER — METOPROLOL TARTRATE 1 MG/ML
INJECTION, SOLUTION INTRAVENOUS
Status: DISPENSED
Start: 2019-06-11

## (undated) RX ORDER — NITROGLYCERIN 0.4 MG/1
TABLET SUBLINGUAL
Status: DISPENSED
Start: 2019-06-11